# Patient Record
Sex: FEMALE | URBAN - METROPOLITAN AREA
[De-identification: names, ages, dates, MRNs, and addresses within clinical notes are randomized per-mention and may not be internally consistent; named-entity substitution may affect disease eponyms.]

---

## 2017-04-14 ENCOUNTER — APPOINTMENT (RX ONLY)
Dept: URBAN - METROPOLITAN AREA CLINIC 158 | Facility: CLINIC | Age: 67
Setting detail: DERMATOLOGY
End: 2017-04-14

## 2017-04-14 DIAGNOSIS — D22 MELANOCYTIC NEVI: ICD-10-CM

## 2017-04-14 DIAGNOSIS — L40.0 PSORIASIS VULGARIS: ICD-10-CM

## 2017-04-14 DIAGNOSIS — L81.4 OTHER MELANIN HYPERPIGMENTATION: ICD-10-CM

## 2017-04-14 DIAGNOSIS — L82.1 OTHER SEBORRHEIC KERATOSIS: ICD-10-CM

## 2017-04-14 DIAGNOSIS — D18.0 HEMANGIOMA: ICD-10-CM

## 2017-04-14 DIAGNOSIS — L72.0 EPIDERMAL CYST: ICD-10-CM

## 2017-04-14 PROBLEM — D18.01 HEMANGIOMA OF SKIN AND SUBCUTANEOUS TISSUE: Status: ACTIVE | Noted: 2017-04-14

## 2017-04-14 PROBLEM — D22.5 MELANOCYTIC NEVI OF TRUNK: Status: ACTIVE | Noted: 2017-04-14

## 2017-04-14 PROBLEM — L85.3 XEROSIS CUTIS: Status: ACTIVE | Noted: 2017-04-14

## 2017-04-14 PROBLEM — E03.9 HYPOTHYROIDISM, UNSPECIFIED: Status: ACTIVE | Noted: 2017-04-14

## 2017-04-14 PROBLEM — L55.1 SUNBURN OF SECOND DEGREE: Status: ACTIVE | Noted: 2017-04-14

## 2017-04-14 PROBLEM — E78.5 HYPERLIPIDEMIA, UNSPECIFIED: Status: ACTIVE | Noted: 2017-04-14

## 2017-04-14 PROBLEM — J30.1 ALLERGIC RHINITIS DUE TO POLLEN: Status: ACTIVE | Noted: 2017-04-14

## 2017-04-14 PROBLEM — M12.9 ARTHROPATHY, UNSPECIFIED: Status: ACTIVE | Noted: 2017-04-14

## 2017-04-14 PROBLEM — K21.9 GASTRO-ESOPHAGEAL REFLUX DISEASE WITHOUT ESOPHAGITIS: Status: ACTIVE | Noted: 2017-04-14

## 2017-04-14 PROBLEM — L57.0 ACTINIC KERATOSIS: Status: ACTIVE | Noted: 2017-04-14

## 2017-04-14 PROCEDURE — ? COUNSELING

## 2017-04-14 PROCEDURE — ? PRESCRIPTION

## 2017-04-14 PROCEDURE — ? TREATMENT REGIMEN

## 2017-04-14 PROCEDURE — 99203 OFFICE O/P NEW LOW 30 MIN: CPT

## 2017-04-14 RX ORDER — BETAMETHASONE DIPROPIONATE 0.5 MG/G
OINTMENT, AUGMENTED TOPICAL TWICE DAILY
Qty: 45 | Refills: 5 | Status: ERX | COMMUNITY
Start: 2017-04-14

## 2017-04-14 RX ADMIN — BETAMETHASONE DIPROPIONATE: 0.5 OINTMENT, AUGMENTED TOPICAL at 18:27

## 2017-04-14 ASSESSMENT — LOCATION DETAILED DESCRIPTION DERM
LOCATION DETAILED: LEFT VENTRAL DISTAL FOREARM
LOCATION DETAILED: RIGHT INFERIOR UPPER BACK
LOCATION DETAILED: RIGHT VENTRAL DISTAL FOREARM
LOCATION DETAILED: MID POSTERIOR NECK
LOCATION DETAILED: RIGHT ANTERIOR PROXIMAL THIGH
LOCATION DETAILED: LEFT MID-UPPER BACK
LOCATION DETAILED: RIGHT LATERAL ABDOMEN
LOCATION DETAILED: RIGHT VENTRAL PROXIMAL FOREARM
LOCATION DETAILED: LEFT ANTERIOR PROXIMAL THIGH
LOCATION DETAILED: RIGHT MID-UPPER BACK
LOCATION DETAILED: RIGHT SUPERIOR LATERAL LOWER BACK
LOCATION DETAILED: LEFT INFERIOR LATERAL MIDBACK
LOCATION DETAILED: LEFT SUPERIOR UPPER BACK
LOCATION DETAILED: UMBILICUS
LOCATION DETAILED: LEFT VENTRAL PROXIMAL FOREARM
LOCATION DETAILED: RIGHT PROXIMAL PRETIBIAL REGION
LOCATION DETAILED: RIGHT POPLITEAL SKIN
LOCATION DETAILED: LEFT DISTAL POSTERIOR THIGH
LOCATION DETAILED: RIGHT SUPERIOR LATERAL MIDBACK

## 2017-04-14 ASSESSMENT — LOCATION ZONE DERM
LOCATION ZONE: TRUNK
LOCATION ZONE: NECK
LOCATION ZONE: LEG
LOCATION ZONE: ARM

## 2017-04-14 ASSESSMENT — LOCATION SIMPLE DESCRIPTION DERM
LOCATION SIMPLE: RIGHT UPPER BACK
LOCATION SIMPLE: LEFT POSTERIOR THIGH
LOCATION SIMPLE: POSTERIOR NECK
LOCATION SIMPLE: RIGHT LOWER BACK
LOCATION SIMPLE: LEFT LOWER BACK
LOCATION SIMPLE: RIGHT PRETIBIAL REGION
LOCATION SIMPLE: LEFT UPPER BACK
LOCATION SIMPLE: RIGHT POPLITEAL SKIN
LOCATION SIMPLE: LEFT FOREARM
LOCATION SIMPLE: RIGHT THIGH
LOCATION SIMPLE: RIGHT FOREARM
LOCATION SIMPLE: LEFT THIGH
LOCATION SIMPLE: ABDOMEN

## 2017-04-14 NOTE — PROCEDURE: TREATMENT REGIMEN
Action 4: Continue
Detail Level: Zone
Initiate Regimen: betamethasone, augmented 0.05 % topical ointment
Plan: Pt to f/u prn

## 2023-11-03 ENCOUNTER — HOSPITAL ENCOUNTER (OUTPATIENT)
Dept: RADIOLOGY | Facility: HOSPITAL | Age: 73
Discharge: HOME | End: 2023-11-03
Attending: FAMILY MEDICINE
Payer: MEDICARE

## 2023-11-03 ENCOUNTER — OFFICE VISIT (OUTPATIENT)
Dept: FAMILY MEDICINE | Facility: CLINIC | Age: 73
End: 2023-11-03
Payer: MEDICARE

## 2023-11-03 VITALS — DIASTOLIC BLOOD PRESSURE: 83 MMHG | SYSTOLIC BLOOD PRESSURE: 129 MMHG | WEIGHT: 210.88 LBS | HEART RATE: 91 BPM

## 2023-11-03 DIAGNOSIS — M17.10 PRIMARY OSTEOARTHRITIS OF KNEE, UNSPECIFIED LATERALITY: Chronic | ICD-10-CM

## 2023-11-03 DIAGNOSIS — E11.9 TYPE 2 DIABETES MELLITUS WITHOUT COMPLICATION, WITH LONG-TERM CURRENT USE OF INSULIN: Chronic | ICD-10-CM

## 2023-11-03 DIAGNOSIS — M51.36 DDD (DEGENERATIVE DISC DISEASE), LUMBAR: Chronic | ICD-10-CM

## 2023-11-03 DIAGNOSIS — Z79.4 TYPE 2 DIABETES MELLITUS WITHOUT COMPLICATION, WITH LONG-TERM CURRENT USE OF INSULIN: Chronic | ICD-10-CM

## 2023-11-03 DIAGNOSIS — R07.81 RIB PAIN: Primary | ICD-10-CM

## 2023-11-03 DIAGNOSIS — Z85.3 HISTORY OF BREAST CANCER: Chronic | ICD-10-CM

## 2023-11-03 DIAGNOSIS — E03.9 HYPOTHYROIDISM, UNSPECIFIED TYPE: Chronic | ICD-10-CM

## 2023-11-03 DIAGNOSIS — Z91.81 HISTORY OF RECENT FALL: ICD-10-CM

## 2023-11-03 DIAGNOSIS — K21.9 GASTROESOPHAGEAL REFLUX DISEASE WITHOUT ESOPHAGITIS: Chronic | ICD-10-CM

## 2023-11-03 DIAGNOSIS — R30.0 DYSURIA: ICD-10-CM

## 2023-11-03 DIAGNOSIS — R07.81 RIB PAIN: ICD-10-CM

## 2023-11-03 PROBLEM — M51.369 DDD (DEGENERATIVE DISC DISEASE), LUMBAR: Chronic | Status: ACTIVE | Noted: 2023-11-03

## 2023-11-03 LAB
BILIRUB SERPL-MCNC: NORMAL MG/DL
BLOOD URINE, POC: NORMAL
COLOR, POC UA: YELLOW
GLUCOSE UR QL STRIP: NORMAL
KETONES UR QL STRIP: NORMAL
LEUKOCYTE ESTERASE URINE, POC: NORMAL
NITRITE, POC UA: NORMAL
PH, POC UA: 6
PROTEIN, POC: NORMAL
SPECIFIC GRAVITY, POC UA: 1.01
UROBILINOGEN, POC UA: 0.2

## 2023-11-03 PROCEDURE — 3074F PR MOST RECENT SYSTOLIC BLOOD PRESSURE < 130 MM HG: ICD-10-PCS | Mod: ,,, | Performed by: FAMILY MEDICINE

## 2023-11-03 PROCEDURE — 3079F PR MOST RECENT DIASTOLIC BLOOD PRESSURE 80-89 MM HG: ICD-10-PCS | Mod: ,,, | Performed by: FAMILY MEDICINE

## 2023-11-03 PROCEDURE — 3079F DIAST BP 80-89 MM HG: CPT | Mod: ,,, | Performed by: FAMILY MEDICINE

## 2023-11-03 PROCEDURE — 81003 POCT URINALYSIS W/O SCOPE: ICD-10-PCS | Mod: QW,,, | Performed by: FAMILY MEDICINE

## 2023-11-03 PROCEDURE — 1159F MED LIST DOCD IN RCRD: CPT | Mod: ,,, | Performed by: FAMILY MEDICINE

## 2023-11-03 PROCEDURE — 81003 URINALYSIS AUTO W/O SCOPE: CPT | Mod: QW,,, | Performed by: FAMILY MEDICINE

## 2023-11-03 PROCEDURE — 1159F PR MEDICATION LIST DOCUMENTED IN MEDICAL RECORD: ICD-10-PCS | Mod: ,,, | Performed by: FAMILY MEDICINE

## 2023-11-03 PROCEDURE — 99204 OFFICE O/P NEW MOD 45 MIN: CPT | Mod: ,,, | Performed by: FAMILY MEDICINE

## 2023-11-03 PROCEDURE — 1160F PR REVIEW ALL MEDS BY PRESCRIBER/CLIN PHARMACIST DOCUMENTED: ICD-10-PCS | Mod: ,,, | Performed by: FAMILY MEDICINE

## 2023-11-03 PROCEDURE — 1160F RVW MEDS BY RX/DR IN RCRD: CPT | Mod: ,,, | Performed by: FAMILY MEDICINE

## 2023-11-03 PROCEDURE — 71100 X-RAY EXAM RIBS UNI 2 VIEWS: CPT | Mod: TC,RT

## 2023-11-03 PROCEDURE — 99204 PR OFFICE/OUTPT VISIT, NEW, LEVL IV, 45-59 MIN: ICD-10-PCS | Mod: ,,, | Performed by: FAMILY MEDICINE

## 2023-11-03 PROCEDURE — 3074F SYST BP LT 130 MM HG: CPT | Mod: ,,, | Performed by: FAMILY MEDICINE

## 2023-11-03 RX ORDER — METFORMIN HYDROCHLORIDE 500 MG/1
500 TABLET, EXTENDED RELEASE ORAL 3 TIMES DAILY
COMMUNITY
Start: 2023-09-22

## 2023-11-03 RX ORDER — LEVOTHYROXINE SODIUM 137 UG/1
137 TABLET ORAL DAILY
COMMUNITY
Start: 2023-09-06

## 2023-11-03 RX ORDER — HYDROCODONE BITARTRATE AND ACETAMINOPHEN 5; 325 MG/1; MG/1
1 TABLET ORAL EVERY 6 HOURS PRN
Qty: 20 TABLET | Refills: 0 | Status: SHIPPED | OUTPATIENT
Start: 2023-11-03

## 2023-11-03 RX ORDER — ANASTROZOLE 1 MG/1
1 TABLET ORAL DAILY
COMMUNITY
Start: 2023-08-09

## 2023-11-03 RX ORDER — OMEPRAZOLE 40 MG/1
1 CAPSULE, DELAYED RELEASE ORAL DAILY
COMMUNITY
Start: 2023-06-07

## 2023-11-03 NOTE — PROGRESS NOTES
Jean Carlos Berger MD   [facility]  MEDICAL GROUP [address] [place] [address]  [phone]      PATIENT NAME: Margarita Turner  : 1950  DATE: 11/3/23  MRN: [id number]      Billing Provider: Jean Carlos Berger MD  Level of Service: [place] OFFICE/OUTPT VISIT, NEW, LEVKELVIN IV, 45-59 MIN  Patient PCP Information       Provider PCP Type    Jean Carlos Berger MD General            Reason for Visit / Chief Complaint: Chest Pain (Right sided rib pain secondary to fall on last night. States she tripped over something outdoors and fell. )       Update PCP  Update Chief Complaint         History of Present Illness / Problem Focused Workflow     Margarita Turner presents to the clinic with Chest Pain (Right sided rib pain secondary to fall on last night. States she tripped over something outdoors and fell. )       72 yo WF with hypothyroidism, DM, osteoarthritis, DDD, GERD and history of breast cancer.  She has recently been treated for UTI and wants UA to make sure that it has cleared.  Was walking dog last night and fell.  Has injury to ribs on right side.      New patient.  Has been getting care in [place], [place].  PCP there, as well as oncologist and ortho.  Scheduled to have TKR on 2023 (Dr. Santiago).  She is UTD on mammogram (history of breast cancer s/p partial mastectomy/lymphadenectomy/chemo tx and rad tx), colonoscopy and DEXA scan.        Review of Systems     Review of Systems   Musculoskeletal:  Positive for arthralgias and leg pain.   All other systems reviewed and are negative.       Medical / Social / Family History     Past Medical History:   Diagnosis Date    Breast cancer     GERD (gastroesophageal reflux disease)     History of vertebral fracture     Hypothyroidism, unspecified     OA (osteoarthritis) of knee     Type 2 diabetes mellitus without complications        Past Surgical History:   Procedure Laterality Date    MASTECTOMY,  PARTIAL, WITH AXILLARY LYMPHADENECTOMY         Social History    reports that she has never smoked. She has never been exposed to tobacco smoke. She has never used smokeless tobacco. She reports that she does not drink alcohol and does not use drugs.   Social History     Tobacco Use    Smoking status: Never     Passive exposure: Never    Smokeless tobacco: Never   Substance Use Topics    Alcohol use: Never    Drug use: Never       Family History  History reviewed. No pertinent family history.    Medications and Allergies     Medications  Outpatient Medications Marked as Taking for the 11/3/23 encounter (Office Visit) with Jean Carlos Berger MD   Medication Sig Dispense Refill    anastrozole (ARIMIDEX) 1 mg Tab Take 1 mg by mouth once daily.      levothyroxine (SYNTHROID) 137 MCG Tab tablet Take 137 mcg by mouth once daily.      metFORMIN (GLUCOPHAGE-XR) 500 MG ER 24hr tablet Take 500 mg by mouth 3 (three) times daily.      omeprazole (PRILOSEC) 40 MG capsule Take 1 capsule by mouth once daily.         Allergies  Review of patient's allergies indicates:  Not on File    Physical Examination     Vitals:    11/03/23 1201   BP: 129/83   Pulse: 91     Physical Exam  Vitals reviewed.   Constitutional:       Appearance: Normal appearance.   HENT:      Head: Normocephalic and atraumatic.   Eyes:      Extraocular Movements: Extraocular movements intact.      Conjunctiva/sclera: Conjunctivae normal.      Pupils: Pupils are equal, round, and reactive to light.   Cardiovascular:      Rate and Rhythm: Normal rate and regular rhythm.      Heart sounds: Normal heart sounds.   Pulmonary:      Effort: Pulmonary effort is normal.      Breath sounds: Normal breath sounds.   Musculoskeletal:         General: Tenderness present. Normal range of motion.      Cervical back: Normal range of motion.   Skin:     General: Skin is warm and dry.   Neurological:      General: No focal deficit present.      Mental Status: She is alert and  oriented to person, place, and time.   Psychiatric:         Mood and Affect: Mood normal.         Behavior: Behavior normal.      Office Visit on 11/03/2023   Component Date Value Ref Range Status    Color, UA 11/03/2023 Yellow   Final    Spec Grav UA 11/03/2023 1.015   Final    pH, UA 11/03/2023 6.0   Final    WBC, UA 11/03/2023 neg   Final    Nitrite, UA 11/03/2023 neg   Final    Protein, POC 11/03/2023 neg   Final    Glucose, UA 11/03/2023 neg   Final    Ketones, UA 11/03/2023 neg   Final    Bilirubin, POC 11/03/2023 neg   Final    Urobilinogen, UA 11/03/2023 0.2   Final    Blood, UA 11/03/2023 neg   Final       X-Ray Ribs 2 View Right  Narrative: EXAMINATION:  XR RIBS 2 VIEW RIGHT    CLINICAL HISTORY:  Pleurodynia    COMPARISON:  None    TECHNIQUE:  Frontal and oblique views of the right ribs.    FINDINGS:  No convincing acute displaced rib fracture demonstrated.  Scattered degenerative change of the thoracic spine.  Impression: As above.    Point of Service: [facility]    Electronically signed by: Patrick Csataneda  Date:    11/03/2023  Time:    12:49          Assessment and Plan (including Health Maintenance)      Problem List  Smart Sets  Document Outside HM   :    Plan: pain control.  Recommend heating pad to ribs on low heat.          Health Maintenance Due   Topic Date Due    Hepatitis C Screening  Never done    Lipid Panel  Never done    TETANUS VACCINE  Never done    DEXA Scan  Never done    Colorectal Cancer Screening  Never done    Shingles Vaccine (1 of 2) Never done    RSV Vaccine (Age 60+) (1 - 1-dose 60+ series) Never done    Pneumococcal Vaccines (Age 65+) (1 - PCV) Never done    Influenza Vaccine (1) Never done    COVID-19 Vaccine (2 - 2023-24 season) 09/01/2023       Problem List Items Addressed This Visit          Neuro    DDD (degenerative disc disease), lumbar (Chronic)       Oncology    History of breast cancer (Chronic)       Endocrine    Type 2 diabetes  mellitus without complication, with long-term current use of insulin (Chronic)    Relevant Medications    metFORMIN (GLUCOPHAGE-XR) 500 MG ER 24hr tablet    Hypothyroidism (Chronic)       GI    Gastroesophageal reflux disease without esophagitis (Chronic)       Orthopedic    Primary osteoarthritis of knee (Chronic)     Other Visit Diagnoses       Rib pain    -  Primary    Relevant Medications    HYDROcodone-acetaminophen (NORCO) 5-325 mg per tablet    Other Relevant Orders    X-Ray Ribs 2 View Right (Completed)    History of recent fall        Relevant Orders    X-Ray Ribs 2 View Right (Completed)    Dysuria        Relevant Orders    POCT URINALYSIS W/O SCOPE (Completed)            The patient has no Health Maintenance topics of status Not Due    No future appointments.           Signature:  MD SAMUEL [facility]  MEDICAL GROUP [facility] - FAMILY MEDICINE  [address] [place] [id number]  [phone]    Date of encounter: 11/3/23

## 2024-05-21 ENCOUNTER — OFFICE VISIT (OUTPATIENT)
Dept: FAMILY MEDICINE | Facility: CLINIC | Age: 74
End: 2024-05-21
Payer: MEDICARE

## 2024-05-21 VITALS
HEART RATE: 109 BPM | DIASTOLIC BLOOD PRESSURE: 81 MMHG | SYSTOLIC BLOOD PRESSURE: 119 MMHG | TEMPERATURE: 98 F | WEIGHT: 194.81 LBS

## 2024-05-21 DIAGNOSIS — Z85.3 HISTORY OF BREAST CANCER: Chronic | ICD-10-CM

## 2024-05-21 DIAGNOSIS — E11.9 TYPE 2 DIABETES MELLITUS WITHOUT COMPLICATION, WITH LONG-TERM CURRENT USE OF INSULIN: Chronic | ICD-10-CM

## 2024-05-21 DIAGNOSIS — I83.90 VARICOSE VEINS OF CALF: Chronic | ICD-10-CM

## 2024-05-21 DIAGNOSIS — N39.0 URINARY TRACT INFECTION WITHOUT HEMATURIA, SITE UNSPECIFIED: ICD-10-CM

## 2024-05-21 DIAGNOSIS — E03.9 HYPOTHYROIDISM, UNSPECIFIED TYPE: Chronic | ICD-10-CM

## 2024-05-21 DIAGNOSIS — Z79.4 TYPE 2 DIABETES MELLITUS WITHOUT COMPLICATION, WITH LONG-TERM CURRENT USE OF INSULIN: Chronic | ICD-10-CM

## 2024-05-21 DIAGNOSIS — R30.0 DYSURIA: Primary | ICD-10-CM

## 2024-05-21 LAB
BILIRUB SERPL-MCNC: NORMAL MG/DL
BLOOD URINE, POC: NORMAL
COLOR, POC UA: YELLOW
GLUCOSE UR QL STRIP: NORMAL
KETONES UR QL STRIP: NORMAL
LEUKOCYTE ESTERASE URINE, POC: NORMAL
NITRITE, POC UA: NORMAL
PH, POC UA: 5.5
PROTEIN, POC: 300
SPECIFIC GRAVITY, POC UA: 1.03
UROBILINOGEN, POC UA: 0.2

## 2024-05-21 PROCEDURE — 1125F AMNT PAIN NOTED PAIN PRSNT: CPT | Mod: ,,, | Performed by: FAMILY MEDICINE

## 2024-05-21 PROCEDURE — 81003 URINALYSIS AUTO W/O SCOPE: CPT | Mod: QW,,, | Performed by: FAMILY MEDICINE

## 2024-05-21 PROCEDURE — 1159F MED LIST DOCD IN RCRD: CPT | Mod: ,,, | Performed by: FAMILY MEDICINE

## 2024-05-21 PROCEDURE — 3074F SYST BP LT 130 MM HG: CPT | Mod: ,,, | Performed by: FAMILY MEDICINE

## 2024-05-21 PROCEDURE — 1160F RVW MEDS BY RX/DR IN RCRD: CPT | Mod: ,,, | Performed by: FAMILY MEDICINE

## 2024-05-21 PROCEDURE — G2211 COMPLEX E/M VISIT ADD ON: HCPCS | Mod: ,,, | Performed by: FAMILY MEDICINE

## 2024-05-21 PROCEDURE — 3079F DIAST BP 80-89 MM HG: CPT | Mod: ,,, | Performed by: FAMILY MEDICINE

## 2024-05-21 PROCEDURE — 99214 OFFICE O/P EST MOD 30 MIN: CPT | Mod: ,,, | Performed by: FAMILY MEDICINE

## 2024-05-21 PROCEDURE — 3288F FALL RISK ASSESSMENT DOCD: CPT | Mod: ,,, | Performed by: FAMILY MEDICINE

## 2024-05-21 PROCEDURE — 1101F PT FALLS ASSESS-DOCD LE1/YR: CPT | Mod: ,,, | Performed by: FAMILY MEDICINE

## 2024-05-21 RX ORDER — CYCLOBENZAPRINE HCL 10 MG
10 TABLET ORAL NIGHTLY
Qty: 90 TABLET | Refills: 1 | Status: SHIPPED | OUTPATIENT
Start: 2024-05-21

## 2024-05-21 RX ORDER — INSULIN DEGLUDEC 200 U/ML
24 INJECTION, SOLUTION SUBCUTANEOUS DAILY
Qty: 2 PEN | Refills: 7 | Status: SHIPPED | OUTPATIENT
Start: 2024-05-21

## 2024-05-21 RX ORDER — NITROFURANTOIN 25; 75 MG/1; MG/1
100 CAPSULE ORAL 2 TIMES DAILY
Qty: 14 CAPSULE | Refills: 0 | Status: SHIPPED | OUTPATIENT
Start: 2024-05-21

## 2024-05-21 RX ORDER — CYCLOBENZAPRINE HCL 10 MG
1 TABLET ORAL 2 TIMES DAILY PRN
COMMUNITY
Start: 2024-04-11

## 2024-05-21 RX ORDER — ATORVASTATIN CALCIUM 20 MG/1
20 TABLET, FILM COATED ORAL NIGHTLY
COMMUNITY
Start: 2024-02-05

## 2024-05-21 NOTE — PROGRESS NOTES
Jean Carlos Berger MD   [facility]  MEDICAL GROUP [facility] - FAMILY MEDICINE  [address] [place] [address] [place] [address]  [phone]      PATIENT NAME: Margarita Turner  : 1950  DATE: 24  MRN: [id number]      Billing Provider: Jean Carlos Berger MD  Level of Service: [place] OFFICE/OUTPT VISIT, EST, LEVL IV, 30-39 MIN  Patient PCP Information       Provider PCP Type    Jean Carlos Berger MD General            Reason for Visit / Chief Complaint: Urinary Urgency (Bladder spasms/) and Headache       Update PCP  Update Chief Complaint         History of Present Illness / Problem Focused Workflow     Margarita Turner presents to the clinic with Urinary Urgency (Bladder spasms/) and Headache       73 yo WF who reports UTI symptoms x 2 days.  Since here last she has had TKR done and reports that she has recovered well.    Patient reports that she recently had labs done at provider in [place].  She will sign a release of records to have results sent her.  She says that her A1c was 6.3.  Needs insulin refilled.  Also takes januvia and metformin for her diabetes.      Has recently been to vein clinic in [place] and is scheduled to have some tests done in next few weeks.    Also has appointment to see ENT in [place] (Dr. Hayward) for headaches.        Review of Systems     Review of Systems   Constitutional:  Negative for activity change, chills and fever.   HENT:  Negative for sore throat.    Eyes:  Negative for pain.   Respiratory:  Negative for cough, chest tightness and shortness of breath.    Cardiovascular:  Negative for chest pain and palpitations.   Gastrointestinal:  Negative for abdominal pain.   Genitourinary:  Positive for dysuria and frequency.   Musculoskeletal:  Positive for arthralgias.   Neurological:  Negative for dizziness, syncope and weakness.   Psychiatric/Behavioral:  Negative for confusion.         Medical / Social / Family History     Past Medical History:   Diagnosis  Date    Breast cancer     GERD (gastroesophageal reflux disease)     History of vertebral fracture     Hypothyroidism, unspecified     OA (osteoarthritis) of knee     Type 2 diabetes mellitus without complications        Past Surgical History:   Procedure Laterality Date    MASTECTOMY, PARTIAL, WITH AXILLARY LYMPHADENECTOMY         Social History    reports that she has never smoked. She has never been exposed to tobacco smoke. She has never used smokeless tobacco. She reports that she does not drink alcohol and does not use drugs.   Social History     Tobacco Use    Smoking status: Never     Passive exposure: Never    Smokeless tobacco: Never   Substance Use Topics    Alcohol use: Never    Drug use: Never       Family History  No family history on file.    Medications and Allergies     Medications  Outpatient Medications Marked as Taking for the 5/21/24 encounter (Office Visit) with Jean Carlos Berger MD   Medication Sig Dispense Refill    atorvastatin (LIPITOR) 20 MG tablet Take 20 mg by mouth every evening.      cyclobenzaprine (FLEXERIL) 10 MG tablet Take 1 tablet by mouth 2 (two) times daily as needed.         Allergies  Review of patient's allergies indicates:  No Known Allergies    Physical Examination     Vitals:    05/21/24 1107   BP: 119/81   Pulse: 109   Temp: 98.1 °F (36.7 °C)     Physical Exam  Vitals reviewed.   Constitutional:       Appearance: Normal appearance.   HENT:      Head: Normocephalic and atraumatic.   Eyes:      Extraocular Movements: Extraocular movements intact.      Conjunctiva/sclera: Conjunctivae normal.      Pupils: Pupils are equal, round, and reactive to light.   Cardiovascular:      Rate and Rhythm: Normal rate and regular rhythm.      Heart sounds: Normal heart sounds.   Pulmonary:      Effort: Pulmonary effort is normal.      Breath sounds: Normal breath sounds.   Musculoskeletal:         General: Normal range of motion.      Cervical back: Normal range of motion.   Skin:      General: Skin is warm and dry.   Neurological:      General: No focal deficit present.      Mental Status: She is alert and oriented to person, place, and time.   Psychiatric:         Mood and Affect: Mood normal.         Behavior: Behavior normal.          Assessment and Plan (including Health Maintenance)      Problem List  Smart Sets  Document Outside HM   :    Plan: This encounter included increased complexity inherent to the evaluation and management associated with medical care services that serve as the continuing focal point for all needed health care services and/or with medical care services that are part of ongoing care related to a patients single, serious condition or a complex condition.  Previous labs and encounters have been reviewed during the course of this visit in order to make medical decisions related to ongoing care of the patient.         Health Maintenance Due   Topic Date Due    Hepatitis C Screening  Never done    Lipid Panel  Never done    Hemoglobin A1c  Never done    Diabetes Urine Screening  Never done    Eye Exam  Never done    TETANUS VACCINE  Never done    Shingles Vaccine (1 of 2) Never done    RSV Vaccine (Age 60+ and Pregnant patients) (1 - 1-dose 60+ series) Never done    Pneumococcal Vaccines (Age 65+) (2 of 2 - PCV) 02/05/2020    DEXA Scan  09/17/2021    COVID-19 Vaccine (4 - 2023-24 season) 09/01/2023       Problem List Items Addressed This Visit          Oncology    History of breast cancer (Chronic)       Endocrine    Type 2 diabetes mellitus without complication, with long-term current use of insulin (Chronic)    Relevant Medications    insulin regular (NOVOLIN R REGULAR U100 INSULIN) 100 unit/mL Inj injection    insulin degludec (TRESIBA FLEXTOUCH U-200) 200 unit/mL (3 mL) insulin pen    Hypothyroidism (Chronic)     Other Visit Diagnoses       Dysuria    -  Primary    Relevant Orders    POCT URINALYSIS W/O SCOPE (Completed)    Urinary tract infection without hematuria,  site unspecified        Relevant Medications    nitrofurantoin, macrocrystal-monohydrate, (MACROBID) 100 MG capsule    Other Relevant Orders    Urine culture    Varicose veins of calf  (Chronic)               Health Maintenance Topics with due status: Not Due       Topic Last Completion Date    Influenza Vaccine 11/03/2022       Future Appointments   Date Time Provider Department Center   6/19/2024 10:00 AM [facility]            Signature:  [facility]  MEDICAL GROUP [facility] - FAMILY MEDICINE  [address] [place] [id number]  [phone]    Date of encounter: 5/21/24

## 2024-05-22 ENCOUNTER — TELEPHONE (OUTPATIENT)
Dept: FAMILY MEDICINE | Facility: CLINIC | Age: 74
End: 2024-05-22
Payer: MEDICARE

## 2024-05-22 RX ORDER — FLUCONAZOLE 150 MG/1
150 TABLET ORAL DAILY
Qty: 1 TABLET | Refills: 0 | Status: SHIPPED | OUTPATIENT
Start: 2024-05-22 | End: 2024-05-23

## 2024-06-09 DIAGNOSIS — Z71.89 COMPLEX CARE COORDINATION: ICD-10-CM

## 2024-06-19 ENCOUNTER — OFFICE VISIT (OUTPATIENT)
Dept: FAMILY MEDICINE | Facility: CLINIC | Age: 74
End: 2024-06-19
Payer: MEDICARE

## 2024-06-19 VITALS — SYSTOLIC BLOOD PRESSURE: 118 MMHG | HEART RATE: 111 BPM | WEIGHT: 196.31 LBS | DIASTOLIC BLOOD PRESSURE: 76 MMHG

## 2024-06-19 DIAGNOSIS — N39.0 URINARY TRACT INFECTION WITHOUT HEMATURIA, SITE UNSPECIFIED: Primary | ICD-10-CM

## 2024-06-19 DIAGNOSIS — E03.9 HYPOTHYROIDISM, UNSPECIFIED TYPE: ICD-10-CM

## 2024-06-19 DIAGNOSIS — E11.9 TYPE 2 DIABETES MELLITUS WITHOUT COMPLICATION, WITH LONG-TERM CURRENT USE OF INSULIN: Chronic | ICD-10-CM

## 2024-06-19 DIAGNOSIS — J32.9 CHRONIC SINUSITIS, UNSPECIFIED LOCATION: Chronic | ICD-10-CM

## 2024-06-19 DIAGNOSIS — I83.90 VARICOSE VEINS OF CALF: Chronic | ICD-10-CM

## 2024-06-19 DIAGNOSIS — Z79.4 TYPE 2 DIABETES MELLITUS WITHOUT COMPLICATION, WITH LONG-TERM CURRENT USE OF INSULIN: Chronic | ICD-10-CM

## 2024-06-19 LAB
BILIRUB SERPL-MCNC: NORMAL MG/DL
BLOOD URINE, POC: NORMAL
COLOR, POC UA: YELLOW
GLUCOSE UR QL STRIP: NORMAL
KETONES UR QL STRIP: NORMAL
LEUKOCYTE ESTERASE URINE, POC: NORMAL
NITRITE, POC UA: NORMAL
PH, POC UA: 5.5
PROTEIN, POC: NORMAL
SPECIFIC GRAVITY, POC UA: 1.01
UROBILINOGEN, POC UA: 0.2

## 2024-06-19 PROCEDURE — G2211 COMPLEX E/M VISIT ADD ON: HCPCS | Mod: ,,, | Performed by: FAMILY MEDICINE

## 2024-06-19 PROCEDURE — 81003 URINALYSIS AUTO W/O SCOPE: CPT | Mod: QW,,, | Performed by: FAMILY MEDICINE

## 2024-06-19 PROCEDURE — 3288F FALL RISK ASSESSMENT DOCD: CPT | Mod: ,,, | Performed by: FAMILY MEDICINE

## 2024-06-19 PROCEDURE — 3074F SYST BP LT 130 MM HG: CPT | Mod: ,,, | Performed by: FAMILY MEDICINE

## 2024-06-19 PROCEDURE — 1160F RVW MEDS BY RX/DR IN RCRD: CPT | Mod: ,,, | Performed by: FAMILY MEDICINE

## 2024-06-19 PROCEDURE — 3078F DIAST BP <80 MM HG: CPT | Mod: ,,, | Performed by: FAMILY MEDICINE

## 2024-06-19 PROCEDURE — 1159F MED LIST DOCD IN RCRD: CPT | Mod: ,,, | Performed by: FAMILY MEDICINE

## 2024-06-19 PROCEDURE — 1126F AMNT PAIN NOTED NONE PRSNT: CPT | Mod: ,,, | Performed by: FAMILY MEDICINE

## 2024-06-19 PROCEDURE — 99214 OFFICE O/P EST MOD 30 MIN: CPT | Mod: ,,, | Performed by: FAMILY MEDICINE

## 2024-06-19 PROCEDURE — 1101F PT FALLS ASSESS-DOCD LE1/YR: CPT | Mod: ,,, | Performed by: FAMILY MEDICINE

## 2024-06-19 RX ORDER — CEFDINIR 300 MG/1
300 CAPSULE ORAL 2 TIMES DAILY
COMMUNITY
Start: 2024-06-10

## 2024-06-19 NOTE — PROGRESS NOTES
"   Jean Carlos Berger MD   [facility]  MEDICAL GROUP [facility] FAMILY MEDICINE  [address] [place] [address] [place] [address]  [phone]      PATIENT NAME: Margarita Turner  : 1950  DATE: 24  MRN: [id number]      Billing Provider: Jean Carlos Berger MD  Level of Service: [place] OFFICE/OUTPT VISIT, EST, LEVL IV, 30-39 MIN  Patient PCP Information       Provider PCP Type    Jean Carlos Berger MD General            Reason for Visit / Chief Complaint: Follow-up and Urinary Tract Infection       Update PCP  Update Chief Complaint         History of Present Illness / Problem Focused Workflow     Margarita Turner presents to the clinic with Follow-up and Urinary Tract Infection       Her for UTI check.  Has completed course of macrobid recently for treatment of UTI.      Had LE dopplers done yesterday in [place] and they showed "No evidence of significant peripheral arterial disease in the bilateral lower extremities at rest."    Has seen ENT and is currently 7 days into a 21 day course of antibiotics (onicef).  Per pt, she may require balloon sinuplasty if symptoms do not clear with antibiotics.      She has smartwatch that momniotrs HR and reports that it has fluctuated between  over the past couple of weeks.  Denies being symptomatic.  Has a history of PVCs.        Review of Systems     Review of Systems   Constitutional:  Negative for activity change, chills and fever.   HENT:  Positive for sinus pressure/congestion. Negative for sore throat.    Eyes:  Negative for pain.   Respiratory:  Negative for cough, chest tightness and shortness of breath.    Cardiovascular:  Negative for chest pain and palpitations.   Gastrointestinal:  Negative for abdominal pain.   Genitourinary:  Positive for dysuria and frequency.   Musculoskeletal:  Positive for arthralgias.   Neurological:  Negative for dizziness, syncope and weakness.   Psychiatric/Behavioral:  Negative for confusion.         Medical " / Social / Family History     Past Medical History:   Diagnosis Date    Breast cancer     GERD (gastroesophageal reflux disease)     History of vertebral fracture     Hypothyroidism, unspecified     OA (osteoarthritis) of knee     Type 2 diabetes mellitus without complications        Past Surgical History:   Procedure Laterality Date    MASTECTOMY, PARTIAL, WITH AXILLARY LYMPHADENECTOMY         Social History    reports that she has never smoked. She has never been exposed to tobacco smoke. She has never used smokeless tobacco. She reports that she does not drink alcohol and does not use drugs.   Social History     Tobacco Use    Smoking status: Never     Passive exposure: Never    Smokeless tobacco: Never   Substance Use Topics    Alcohol use: Never    Drug use: Never       Family History  No family history on file.    Medications and Allergies     Medications  Outpatient Medications Marked as Taking for the 6/19/24 encounter (Office Visit) with Jean Carlso Berger MD   Medication Sig Dispense Refill    anastrozole (ARIMIDEX) 1 mg Tab Take 1 mg by mouth once daily.      atorvastatin (LIPITOR) 20 MG tablet Take 20 mg by mouth every evening.      cefdinir (OMNICEF) 300 MG capsule Take 300 mg by mouth 2 (two) times daily.      cyclobenzaprine (FLEXERIL) 10 MG tablet Take 1 tablet by mouth 2 (two) times daily as needed.      insulin degludec (TRESIBA FLEXTOUCH U-200) 200 unit/mL (3 mL) insulin pen Inject 24 Units into the skin once daily. 2 pen 7    insulin regular (NOVOLIN R REGULAR U100 INSULIN) 100 unit/mL Inj injection Inject 4 Units into the skin once daily. 3 mL 11    levothyroxine (SYNTHROID) 137 MCG Tab tablet Take 137 mcg by mouth once daily.      metFORMIN (GLUCOPHAGE-XR) 500 MG ER 24hr tablet Take 500 mg by mouth 3 (three) times daily.      omeprazole (PRILOSEC) 40 MG capsule Take 1 capsule by mouth once daily.         Allergies  Review of patient's allergies indicates:  No Known Allergies    Physical  Examination     Vitals:    06/19/24 1024   BP: 118/76   Pulse: (!) 111     Physical Exam  Vitals reviewed.   Constitutional:       Appearance: Normal appearance.   HENT:      Head: Normocephalic and atraumatic.   Eyes:      Extraocular Movements: Extraocular movements intact.      Conjunctiva/sclera: Conjunctivae normal.      Pupils: Pupils are equal, round, and reactive to light.   Cardiovascular:      Rate and Rhythm: Normal rate and regular rhythm.      Heart sounds: Normal heart sounds.   Pulmonary:      Effort: Pulmonary effort is normal.      Breath sounds: Normal breath sounds.   Musculoskeletal:         General: Normal range of motion.      Cervical back: Normal range of motion.   Skin:     General: Skin is warm and dry.   Neurological:      General: No focal deficit present.      Mental Status: She is alert and oriented to person, place, and time.   Psychiatric:         Mood and Affect: Mood normal.         Behavior: Behavior normal.          Assessment and Plan (including Health Maintenance)      Problem List  Smart Sets  Document Outside HM   :    Plan: This encounter included increased complexity inherent to the evaluation and management associated with medical care services that serve as the continuing focal point for all needed health care services and/or with medical care services that are part of ongoing care related to a patients single, serious condition or a complex condition.  Previous labs and encounters have been reviewed during the course of this visit in order to make medical decisions related to ongoing care of the patient.         Health Maintenance Due   Topic Date Due    Hepatitis C Screening  Never done    Lipid Panel  Never done    Hemoglobin A1c  Never done    Diabetes Urine Screening  Never done    Eye Exam  Never done    TETANUS VACCINE  Never done    Shingles Vaccine (1 of 2) Never done    RSV Vaccine (Age 60+ and Pregnant patients) (1 - 1-dose 60+ series) Never done     Pneumococcal Vaccines (Age 65+) (2 of 2 - PCV) 02/05/2020    DEXA Scan  09/17/2021    COVID-19 Vaccine (4 - 2023-24 season) 09/01/2023       Problem List Items Addressed This Visit          ENT    Chronic sinusitis (Chronic)    Overview     seeing ENT            Cardiac/Vascular    Varicose veins of calf (Chronic)    Overview     seeing vein clinic            Endocrine    Type 2 diabetes mellitus without complication, with long-term current use of insulin (Chronic)    Relevant Orders    Hemoglobin A1C    Lipid Panel    Microalbumin/Creatinine Ratio, Urine    CBC Auto Differential    Basic Metabolic Panel    Hypothyroidism (Chronic)    Relevant Orders    Lipid Panel    CBC Auto Differential    Basic Metabolic Panel    TSH     Other Visit Diagnoses       Urinary tract infection without hematuria, site unspecified    -  Primary    Relevant Orders    POCT URINALYSIS W/O SCOPE (Completed)            Health Maintenance Topics with due status: Not Due       Topic Last Completion Date    Influenza Vaccine 11/03/2022       Future Appointments   Date Time Provider Department Center   8/30/2024 10:00 AM [facility]            Signature:  MD SAMUEL [facility]  MEDICAL GROUP [facility] - FAMILY MEDICINE  [address] [place] [address]  [phone]    Date of encounter: 6/19/24

## 2024-07-30 DIAGNOSIS — Z79.4 TYPE 2 DIABETES MELLITUS WITHOUT COMPLICATION, WITH LONG-TERM CURRENT USE OF INSULIN: ICD-10-CM

## 2024-07-30 DIAGNOSIS — E11.9 TYPE 2 DIABETES MELLITUS WITHOUT COMPLICATION, WITH LONG-TERM CURRENT USE OF INSULIN: Primary | ICD-10-CM

## 2024-07-30 DIAGNOSIS — E11.9 TYPE 2 DIABETES MELLITUS WITHOUT COMPLICATION, WITH LONG-TERM CURRENT USE OF INSULIN: ICD-10-CM

## 2024-07-30 DIAGNOSIS — G62.9 NEUROPATHY: Primary | ICD-10-CM

## 2024-07-30 DIAGNOSIS — Z79.4 TYPE 2 DIABETES MELLITUS WITHOUT COMPLICATION, WITH LONG-TERM CURRENT USE OF INSULIN: Primary | ICD-10-CM

## 2024-07-30 RX ORDER — GABAPENTIN 100 MG/1
100 CAPSULE ORAL 3 TIMES DAILY
Qty: 270 CAPSULE | Refills: 0 | Status: SHIPPED | OUTPATIENT
Start: 2024-07-30

## 2024-07-30 RX ORDER — GABAPENTIN 100 MG/1
100 CAPSULE ORAL 3 TIMES DAILY
COMMUNITY
Start: 2024-04-11 | End: 2024-07-30 | Stop reason: SDUPTHER

## 2024-07-30 RX ORDER — METFORMIN HYDROCHLORIDE 500 MG/1
500 TABLET, EXTENDED RELEASE ORAL 3 TIMES DAILY
Qty: 90 TABLET | Refills: 5 | Status: SHIPPED | OUTPATIENT
Start: 2024-07-30

## 2024-08-26 DIAGNOSIS — Z79.4 TYPE 2 DIABETES MELLITUS WITHOUT COMPLICATION, WITH LONG-TERM CURRENT USE OF INSULIN: Primary | ICD-10-CM

## 2024-08-26 DIAGNOSIS — E11.9 TYPE 2 DIABETES MELLITUS WITHOUT COMPLICATION, WITH LONG-TERM CURRENT USE OF INSULIN: Primary | ICD-10-CM

## 2024-08-26 RX ORDER — SITAGLIPTIN 100 MG/1
100 TABLET, FILM COATED ORAL DAILY
COMMUNITY
End: 2024-08-26 | Stop reason: SDUPTHER

## 2024-08-26 NOTE — TELEPHONE ENCOUNTER
----- Message from Abigail Mcdowell sent at 8/26/2024 10:20 AM CDT -----  PT NEEDS REFILL ON JANUVIA 100MG CALLED TO THE PHARMACY

## 2024-08-27 RX ORDER — SITAGLIPTIN 100 MG/1
100 TABLET, FILM COATED ORAL DAILY
Qty: 90 TABLET | Refills: 0 | Status: SHIPPED | OUTPATIENT
Start: 2024-08-27

## 2024-08-30 ENCOUNTER — OFFICE VISIT (OUTPATIENT)
Dept: FAMILY MEDICINE | Facility: CLINIC | Age: 74
End: 2024-08-30
Payer: MEDICARE

## 2024-08-30 VITALS
OXYGEN SATURATION: 96 % | HEART RATE: 91 BPM | RESPIRATION RATE: 14 BRPM | HEIGHT: 66 IN | SYSTOLIC BLOOD PRESSURE: 138 MMHG | DIASTOLIC BLOOD PRESSURE: 83 MMHG | WEIGHT: 200.31 LBS | BODY MASS INDEX: 32.19 KG/M2

## 2024-08-30 DIAGNOSIS — Z00.00 ENCOUNTER FOR SUBSEQUENT ANNUAL WELLNESS VISIT (AWV) IN MEDICARE PATIENT: Primary | ICD-10-CM

## 2024-08-30 DIAGNOSIS — Z85.3 HISTORY OF BREAST CANCER: Chronic | ICD-10-CM

## 2024-08-30 DIAGNOSIS — I83.813 VARICOSE VEINS OF BILATERAL LOWER EXTREMITIES WITH PAIN: Chronic | ICD-10-CM

## 2024-08-30 DIAGNOSIS — E66.09 CLASS 1 OBESITY DUE TO EXCESS CALORIES WITH SERIOUS COMORBIDITY AND BODY MASS INDEX (BMI) OF 32.0 TO 32.9 IN ADULT: Chronic | ICD-10-CM

## 2024-08-30 DIAGNOSIS — E03.9 HYPOTHYROIDISM, UNSPECIFIED TYPE: Chronic | ICD-10-CM

## 2024-08-30 DIAGNOSIS — Z79.4 TYPE 2 DIABETES MELLITUS WITH DIABETIC POLYNEUROPATHY, WITH LONG-TERM CURRENT USE OF INSULIN: Chronic | ICD-10-CM

## 2024-08-30 DIAGNOSIS — E11.42 TYPE 2 DIABETES MELLITUS WITH DIABETIC POLYNEUROPATHY, WITH LONG-TERM CURRENT USE OF INSULIN: Chronic | ICD-10-CM

## 2024-08-30 DIAGNOSIS — C79.81: ICD-10-CM

## 2024-08-30 RX ORDER — ASPIRIN 81 MG/1
81 TABLET ORAL
COMMUNITY

## 2024-08-30 NOTE — PROGRESS NOTES
"  Margarita Turner presented for a  Medicare AWV and comprehensive Health Risk Assessment today. The following components were reviewed and updated:    Medical history  Family History  Social history  Allergies and Current Medications  Health Risk Assessment  Health Maintenance  Care Team         ** See Completed Assessments for Annual Wellness Visit within the encounter summary.**         The following assessments were completed:  Living Situation  CAGE  Depression Screening  Timed Get Up and Go  Whisper Test  Cognitive Function Screening  Nutrition Screening  ADL Screening  PAQ Screening        Opioid documentation:      Patient does not have a current opioid prescription.        Vitals:    08/30/24 1032   BP: 138/83   BP Location: Right arm   Patient Position: Sitting   Pulse: 91   Resp: 14   SpO2: 96%   Weight: 90.9 kg (200 lb 4.8 oz)   Height: 5' 6" (1.676 m)     Body mass index is 32.33 kg/m².  Physical Exam  Vitals reviewed.   Constitutional:       Appearance: Normal appearance.   HENT:      Head: Normocephalic and atraumatic.   Eyes:      Extraocular Movements: Extraocular movements intact.      Conjunctiva/sclera: Conjunctivae normal.      Pupils: Pupils are equal, round, and reactive to light.   Cardiovascular:      Rate and Rhythm: Normal rate and regular rhythm.      Heart sounds: Normal heart sounds.   Pulmonary:      Effort: Pulmonary effort is normal.      Breath sounds: Normal breath sounds.   Musculoskeletal:         General: Normal range of motion.      Cervical back: Normal range of motion.   Skin:     General: Skin is warm and dry.   Neurological:      General: No focal deficit present.      Mental Status: She is alert and oriented to person, place, and time.   Psychiatric:         Mood and Affect: Mood normal.         Behavior: Behavior normal.             Diagnoses and health risks identified today and associated recommendations/orders:    1. Encounter for subsequent annual wellness visit (AWV) " in Medicare patient  2025 AWV scheduled    2. Type 2 diabetes mellitus with diabetic polyneuropathy, with long-term current use of insulin  A1C well controlled, continue current care    3. Hypothyroidism, unspecified type  Continue synthroid and will recheck  TSH in 2 months    4. Class 1 obesity due to excess calories with serious comorbidity and body mass index (BMI) of 32.0 to 32.9 in adult  Healthy eating and regular exercise    5. Varicose veins of bilateral lower extremities with pain    - Ambulatory referral/consult to [facility]; Future    6. History of breast cancer  Has oncology follow up scheduled      Provided Margarita with a 5-10 year written screening schedule and personal prevention plan. Recommendations were developed using the USPSTF age appropriate recommendations. Education, counseling, and referrals were provided as needed. After Visit Summary printed and given to patient which includes a list of additional screenings\tests needed.    [facility]  She reports recent eye exam in [place], [place], but does not know the name of the doctor or clinic  Declines pneumonia vaccine at today's visit    Follow up in 10 weeks (on 11/8/2024), or we will recheck thyroid at that time, for 1 year for Annual Wellness Visit.    Jean Carlos Berger MD    I offered to discuss advanced care planning, including how to pick a person who would make decisions for you if you were unable to make them for yourself, called a health care power of , and what kind of decisions you might make such as use of life sustaining treatments such as ventilators and tube feeding when faced with a life limiting illness recorded on a living will that they will need to know. (How you want to be cared for as you near the end of your natural life)     X Patient is interested in learning more about how to make advanced directives.  I provided them paperwork and offered to discuss this with them.

## 2024-08-30 NOTE — PATIENT INSTRUCTIONS
Counseling and Referral of Other Preventative  (Italic type indicates deductible and co-insurance are waived)    Patient Name: Margarita Turner  Today's Date: 8/30/2024    Health Maintenance       Date Due Completion Date    Hepatitis C Screening Never done ---    Eye Exam Never done ---    TETANUS VACCINE Never done ---    Shingles Vaccine (1 of 2) Never done ---    RSV Vaccine (Age 60+ and Pregnant patients) (1 - 1-dose 60+ series) Never done ---    Pneumococcal Vaccines (Age 65+) (2 of 2 - PCV) 02/05/2020 2/5/2019    DEXA Scan 09/17/2021 9/17/2018    COVID-19 Vaccine (4 - 2023-24 season) 09/01/2023 11/3/2022    Influenza Vaccine (1) 09/01/2024 11/3/2022    Hemoglobin A1c 01/30/2025 7/30/2024    Diabetes Urine Screening 07/30/2025 7/30/2024    Lipid Panel 07/30/2025 7/30/2024        No orders of the defined types were placed in this encounter.    The following information is provided to all patients.  This information is to help you find resources for any of the problems found today that may be affecting your health:                  Living healthy guide: [place].gov    Understanding Diabetes: www.diabetes.org      Eating healthy: www.cdc.gov/healthyweight      CDC home safety checklist: www.cdc.gov/steadi/patient.html      Agency on Aging: [place].gov    Alcoholics anonymous (AA): www.aa.org      Physical Activity: www.edmar.nih.gov/vo1cecu      Tobacco use: [place].gov

## 2024-09-24 ENCOUNTER — OFFICE VISIT (OUTPATIENT)
Dept: VASCULAR SURGERY | Facility: CLINIC | Age: 74
End: 2024-09-24
Payer: MEDICARE

## 2024-09-24 VITALS
HEIGHT: 66 IN | WEIGHT: 193.38 LBS | RESPIRATION RATE: 18 BRPM | SYSTOLIC BLOOD PRESSURE: 135 MMHG | DIASTOLIC BLOOD PRESSURE: 68 MMHG | BODY MASS INDEX: 31.08 KG/M2 | HEART RATE: 89 BPM

## 2024-09-24 DIAGNOSIS — I87.2 VENOUS INSUFFICIENCY: Primary | ICD-10-CM

## 2024-09-24 DIAGNOSIS — M79.605 LEG PAIN, BILATERAL: ICD-10-CM

## 2024-09-24 DIAGNOSIS — M79.604 LEG PAIN, BILATERAL: ICD-10-CM

## 2024-09-24 DIAGNOSIS — I83.813 VARICOSE VEINS OF BILATERAL LOWER EXTREMITIES WITH PAIN: Chronic | ICD-10-CM

## 2024-09-24 DIAGNOSIS — R60.0 EDEMA, LOWER EXTREMITY: ICD-10-CM

## 2024-09-24 PROCEDURE — 1159F MED LIST DOCD IN RCRD: CPT | Mod: CPTII,,, | Performed by: FAMILY MEDICINE

## 2024-09-24 PROCEDURE — 99999 PR PBB SHADOW E&M-EST. PATIENT-LVL V: CPT | Mod: PBBFAC,,, | Performed by: FAMILY MEDICINE

## 2024-09-24 PROCEDURE — 3075F SYST BP GE 130 - 139MM HG: CPT | Mod: CPTII,,, | Performed by: FAMILY MEDICINE

## 2024-09-24 PROCEDURE — 99203 OFFICE O/P NEW LOW 30 MIN: CPT | Mod: S$PBB,,, | Performed by: FAMILY MEDICINE

## 2024-09-24 PROCEDURE — 1160F RVW MEDS BY RX/DR IN RCRD: CPT | Mod: CPTII,,, | Performed by: FAMILY MEDICINE

## 2024-09-24 PROCEDURE — 99215 OFFICE O/P EST HI 40 MIN: CPT | Mod: PBBFAC | Performed by: FAMILY MEDICINE

## 2024-09-24 PROCEDURE — 3078F DIAST BP <80 MM HG: CPT | Mod: CPTII,,, | Performed by: FAMILY MEDICINE

## 2024-09-24 RX ORDER — LEVOFLOXACIN 500 MG/1
500 TABLET, FILM COATED ORAL
COMMUNITY
Start: 2024-09-06

## 2024-09-24 RX ORDER — HYDROCODONE BITARTRATE AND ACETAMINOPHEN 10; 325 MG/1; MG/1
1 TABLET ORAL EVERY 8 HOURS PRN
COMMUNITY
Start: 2024-09-19

## 2024-09-24 NOTE — PROGRESS NOTES
VEIN CENTER CLINIC NOTE    Patient ID: Margarita Turner is a 74 y.o. female.    I. HISTORY     Chief Complaint:   Chief Complaint   Patient presents with    Varicose Veins     NP; REF BY KATH TAN; VARICOSE VEINS WITH PAIN         HPI: Magrarita Turner is a 74 y.o. female who is referred here today by Dr. Kath Tan for evaluation of lower extremity edema, varicose veins and pain.  Symptoms are worsening/persistent and began greater than 2 years ago.  Location is bilateral lower extremities below the knees. Symptoms are worse at the end of the day.  History of venous interventions includes none.  Denies family history of venous disease.  Denies history of DVT or cellulitis.  She states she has been wearing compression stockings for about 30-40 years.    The patient had a reflux study performed on 06/25/2024 at [facility] which noted reflux in the bilateral great saphenous veins as well as bilateral pathological perforators.  No DVTs bilaterally.  No deep venous reflux.  No reflux and small saphenous veins.    Venous Disease Medical Necessity Documentation Initial Visit Date:  09/24/2024 Return Check Date:    Have you ever had a rupture or bleed from a varicose vein in your leg(s)?              [] Yes  [x] No   [] Yes   [] No   Have you ever been diagnosed with phlebitis, cellulitis, or inflammation in the area of the varicose veins of  your leg(s)?  [] Yes  [x] No    [] Yes   [] No   Do you have darkened or inflamed skin on your legs?   [x] Yes   [] No   [] Yes   [] No   Do you have leg swelling?     [x] Yes   [] No   [] Yes   [] No   Do you have leg pain?   [x] Yes   [] No   [] Yes   [] No   If yes, describe the type of pain?    [x]   Stabbing [x]  Radiating [x]  Aching   [x]  Tightness [x]  Throbbing               [x]  Burning [x]  Cramping              Do you have leg discomfort?   [x] Yes   [] No   [] Yes   [] No   If yes, describe the type of discomfort?    [x]  Heaviness [x]   Fullness   [x]  Restlessness [x] Tired/Fatigued [x] Itching              Have you ever worn compression hose?   [x] Yes   [] No   [] Yes   [] No   If yes, how long? ON AND OFF 15+ YEARS       Do you elevate your legs while sitting?   [x] Yes   [] No   [] Yes   [] No   Does venous disease (varicose veins, ulcers, skin changes, leg pain/swelling) interfere with your daily life?  If yes, check activities you are limited or unable to do.    [x]  Shower  [x]   Walk  []  Exercise  [] Play with children/grandchildren  []  Shop [] Work [x] Stand for any period of time [x] Sleep                               [x] Sitting for an extended period of time.           [x] Yes   [] No   [] Yes   [] No   Do you exercise/have you tried to exercise (i.e.  Walk our participate in a regular exercise routine)?  [x] Yes  [] No   [] Yes   [] No   BMI   31.22           Past Medical History:   Diagnosis Date    Breast cancer     GERD (gastroesophageal reflux disease)     History of vertebral fracture     Hypothyroidism, unspecified     OA (osteoarthritis) of knee     Type 2 diabetes mellitus without complications         Past Surgical History:   Procedure Laterality Date    APPENDECTOMY      CATARACT EXTRACTION, BILATERAL      KNEE ARTHROSCOPY Right     MASTECTOMY, PARTIAL, WITH AXILLARY LYMPHADENECTOMY      OOPHORECTOMY Right     PARTIAL HYSTERECTOMY      REMOVAL OF BONE SPUR OF FOOT Right     TOTAL KNEE ARTHROPLASTY Left 11/2023    TUBAL LIGATION         Social History     Tobacco Use   Smoking Status Never    Passive exposure: Never   Smokeless Tobacco Never         Current Outpatient Medications:     anastrozole (ARIMIDEX) 1 mg Tab, Take 1 mg by mouth once daily., Disp: , Rfl:     aspirin (ECOTRIN) 81 MG EC tablet, Take 81 mg by mouth., Disp: , Rfl:     atorvastatin (LIPITOR) 20 MG tablet, Take 20 mg by mouth every evening., Disp: , Rfl:     cyclobenzaprine (FLEXERIL) 10 MG tablet, Take 1 tablet (10 mg total) by mouth every evening.,  Disp: 90 tablet, Rfl: 1    gabapentin (NEURONTIN) 100 MG capsule, Take 1 capsule (100 mg total) by mouth 3 (three) times daily., Disp: 270 capsule, Rfl: 0    HYDROcodone-acetaminophen (NORCO)  mg per tablet, Take 1 tablet by mouth every 8 (eight) hours as needed. PRN, Disp: , Rfl:     insulin degludec (TRESIBA FLEXTOUCH U-200) 200 unit/mL (3 mL) insulin pen, Inject 24 Units into the skin once daily., Disp: 2 pen , Rfl: 7    insulin regular (NOVOLIN R REGULAR U100 INSULIN) 100 unit/mL Inj injection, Inject 4 Units into the skin once daily., Disp: 3 mL, Rfl: 11    JANUVIA 100 mg Tab, Take 1 tablet (100 mg total) by mouth once daily., Disp: 90 tablet, Rfl: 0    levoFLOXacin (LEVAQUIN) 500 MG tablet, Take 500 mg by mouth., Disp: , Rfl:     levothyroxine (SYNTHROID) 137 MCG Tab tablet, Take 137 mcg by mouth once daily., Disp: , Rfl:     metFORMIN (GLUCOPHAGE-XR) 500 MG ER 24hr tablet, Take 1 tablet (500 mg total) by mouth 3 (three) times daily., Disp: 90 tablet, Rfl: 5    omeprazole (PRILOSEC) 40 MG capsule, Take 1 capsule by mouth once daily., Disp: , Rfl:     Review of Systems   Constitutional:  Negative for activity change, chills, diaphoresis, fatigue and fever.   Respiratory:  Negative for cough and shortness of breath.    Cardiovascular:  Positive for leg swelling. Negative for chest pain and claudication.        Hyperpigmentation LE   Gastrointestinal:  Negative for nausea and vomiting.   Musculoskeletal:  Positive for leg pain. Negative for joint swelling.   Integumentary:  Negative for rash and wound.   Neurological:  Negative for weakness and numbness.          II. PHYSICAL EXAM     Physical Exam  Constitutional:       General: She is awake. She is not in acute distress.     Appearance: Normal appearance. She is obese. She is not ill-appearing or toxic-appearing.   HENT:      Head: Normocephalic and atraumatic.   Eyes:      Extraocular Movements: Extraocular movements intact.      Conjunctiva/sclera:  Conjunctivae normal.      Pupils: Pupils are equal, round, and reactive to light.   Neck:      Vascular: No carotid bruit or JVD.   Cardiovascular:      Rate and Rhythm: Normal rate and regular rhythm.      Pulses:           Dorsalis pedis pulses are detected w/ Doppler on the right side and detected w/ Doppler on the left side.        Posterior tibial pulses are detected w/ Doppler on the right side and detected w/ Doppler on the left side.      Heart sounds: No murmur heard.  Pulmonary:      Effort: Pulmonary effort is normal. No respiratory distress.      Breath sounds: No stridor. No wheezing, rhonchi or rales.   Musculoskeletal:         General: No swelling, tenderness or deformity.      Right lower le+ Edema present.      Left lower le+ Edema present.      Comments: No evidence of cellulitis, weeping or open ulceration bilaterally.   Feet:      Comments: Triphasic hand-held dopplerable pulses of the bilateral dorsalis pedis and posterior tibial arteries.  Skin:     General: Skin is warm.      Capillary Refill: Capillary refill takes less than 2 seconds.      Coloration: Skin is not ashen.      Findings: No bruising, erythema, lesion, rash or wound.   Neurological:      Mental Status: She is alert and oriented to person, place, and time.      Motor: No weakness.   Psychiatric:         Speech: Speech normal.         Behavior: Behavior normal. Behavior is cooperative.         Reticular/Spider veins noted:  RLE: anterior calf, medial calf, posterior calf, and lateral calf  LLE: anterior calf, medial calf, posterior calf, and lateral calf    Varicose veins noted:  RLE: anterior calf, medial calf, posterior calf, and lateral calf  LLE:  anterior calf, medial calf, posterior calf, and lateral calf    CEAP Classification  Clinical Signs: Class 3 - Edema  Etiologic Classification: Primary  Anatomic distribution: Superficial  Pathophysiologic dysfunction: Reflux                         Venous Clinical Severity  Score  Pain:2=Daily, moderate activity limitation, occasional analgesics  Varicose Veins: 2=Multiple. GS varicose veins confined to calf or thigh  Venous Edema: 1=Evening ankle edema only  Pigmentation: 0=None or focal, low intensity (tan)  Inflammation: 0=None  Induration: 0=None  Number of Active Ulcers: 0=0  Active Ulceration, Duration: 0=None  Active Ulcer Size: 0=None  Compressive Therapy: 3=Full compliance, stockings + elevation  Total Score: 8       III. ASSESSMENT & PLAN (MEDICAL DECISION MAKING)     1. Venous insufficiency    2. Varicose veins of bilateral lower extremities with pain    3. Leg pain, bilateral    4. Edema, lower extremity        Assessment/Diagnosis and Plan:  Patient has complaints, symptoms and physical exam findings of chronic venous disease.  Therefore, I will order a bilateral complete venous reflux study and see the patient back with results.    Orders Placed This Encounter    US Venous Reflux Study Bilateral          Felix Padron DO

## 2024-09-27 PROBLEM — M79.605 LEG PAIN, BILATERAL: Status: ACTIVE | Noted: 2024-09-27

## 2024-09-27 PROBLEM — I87.2 VENOUS INSUFFICIENCY: Status: ACTIVE | Noted: 2024-09-27

## 2024-09-27 PROBLEM — R60.0 EDEMA, LOWER EXTREMITY: Status: ACTIVE | Noted: 2024-09-27

## 2024-09-27 PROBLEM — M79.604 LEG PAIN, BILATERAL: Status: ACTIVE | Noted: 2024-09-27

## 2024-10-07 ENCOUNTER — TELEPHONE (OUTPATIENT)
Dept: FAMILY MEDICINE | Facility: CLINIC | Age: 74
End: 2024-10-07
Payer: MEDICARE

## 2024-10-07 DIAGNOSIS — B02.8 HERPES ZOSTER WITH COMPLICATION: Primary | ICD-10-CM

## 2024-10-07 NOTE — TELEPHONE ENCOUNTER
Pt called wanting rx acyclovir.  Says she thinks she has shingles.  [facility]

## 2024-10-10 RX ORDER — ACYCLOVIR 800 MG/1
800 TABLET ORAL 2 TIMES DAILY
Qty: 60 TABLET | Refills: 11 | Status: SHIPPED | OUTPATIENT
Start: 2024-10-10

## 2024-10-22 ENCOUNTER — HOSPITAL ENCOUNTER (OUTPATIENT)
Dept: RADIOLOGY | Facility: HOSPITAL | Age: 74
Discharge: HOME | End: 2024-10-22
Attending: FAMILY MEDICINE
Payer: MEDICARE

## 2024-10-22 ENCOUNTER — OFFICE VISIT (OUTPATIENT)
Dept: VASCULAR SURGERY | Facility: CLINIC | Age: 74
End: 2024-10-22
Attending: FAMILY MEDICINE
Payer: MEDICARE

## 2024-10-22 VITALS
RESPIRATION RATE: 18 BRPM | SYSTOLIC BLOOD PRESSURE: 119 MMHG | HEART RATE: 91 BPM | HEIGHT: 66 IN | BODY MASS INDEX: 31.07 KG/M2 | WEIGHT: 193.31 LBS | DIASTOLIC BLOOD PRESSURE: 63 MMHG

## 2024-10-22 DIAGNOSIS — M79.604 LEG PAIN, BILATERAL: ICD-10-CM

## 2024-10-22 DIAGNOSIS — R60.0 EDEMA, LOWER EXTREMITY: ICD-10-CM

## 2024-10-22 DIAGNOSIS — I87.2 VENOUS INSUFFICIENCY: Primary | ICD-10-CM

## 2024-10-22 DIAGNOSIS — I83.813 VARICOSE VEINS OF BILATERAL LOWER EXTREMITIES WITH PAIN: ICD-10-CM

## 2024-10-22 DIAGNOSIS — I87.2 VENOUS INSUFFICIENCY (CHRONIC) (PERIPHERAL): ICD-10-CM

## 2024-10-22 DIAGNOSIS — M79.605 LEG PAIN, BILATERAL: ICD-10-CM

## 2024-10-22 PROCEDURE — 93970 EXTREMITY STUDY: CPT | Mod: TC

## 2024-10-22 PROCEDURE — 3074F SYST BP LT 130 MM HG: CPT | Mod: CPTII,,, | Performed by: FAMILY MEDICINE

## 2024-10-22 PROCEDURE — 93970 EXTREMITY STUDY: CPT | Mod: 26,,, | Performed by: FAMILY MEDICINE

## 2024-10-22 PROCEDURE — 99214 OFFICE O/P EST MOD 30 MIN: CPT | Mod: S$PBB,,, | Performed by: FAMILY MEDICINE

## 2024-10-22 PROCEDURE — 1160F RVW MEDS BY RX/DR IN RCRD: CPT | Mod: CPTII,,, | Performed by: FAMILY MEDICINE

## 2024-10-22 PROCEDURE — 1159F MED LIST DOCD IN RCRD: CPT | Mod: CPTII,,, | Performed by: FAMILY MEDICINE

## 2024-10-22 PROCEDURE — 99214 OFFICE O/P EST MOD 30 MIN: CPT | Mod: PBBFAC,25 | Performed by: FAMILY MEDICINE

## 2024-10-22 PROCEDURE — 99999 PR PBB SHADOW E&M-EST. PATIENT-LVL IV: CPT | Mod: PBBFAC,,, | Performed by: FAMILY MEDICINE

## 2024-10-22 PROCEDURE — 3078F DIAST BP <80 MM HG: CPT | Mod: CPTII,,, | Performed by: FAMILY MEDICINE

## 2024-10-22 RX ORDER — SODIUM CHLORIDE 9 MG/ML
INJECTION, SOLUTION INTRAVENOUS CONTINUOUS
OUTPATIENT
Start: 2024-12-13

## 2024-10-22 RX ORDER — SODIUM CHLORIDE 9 MG/ML
INJECTION, SOLUTION INTRAVENOUS CONTINUOUS
OUTPATIENT
Start: 2024-12-06

## 2024-10-22 RX ORDER — SODIUM CHLORIDE 9 MG/ML
INJECTION, SOLUTION INTRAVENOUS CONTINUOUS
OUTPATIENT
Start: 2024-12-20

## 2024-10-22 NOTE — PATIENT INSTRUCTIONS
Pre Prodedure Information    Procedure (s) and Date (s):  1. Left GSV VenaSeal Ablationw w/Left calf  VenaSeal ablation - 12/06/2024      2. Left SSV VenaSeal Ablation - 12/13/2024      3. Right Right GSV VenaSeal Ablation w/Right Calf  VenaSeal Ablation - 12/20/2024  Do not eat or drink anything after midnight.  You may take medication Synthroid  with a small sip of water morning of each procedure.  We will call you the day prior with your time to report for surgery.  You will check in at Ambulatory Surgery on the Ground Floor.  Shower prior to procedure as your leg will be wrapped for 48 hours and you will not be able to shower.  Do not wear lotion, oil, or cream on you legs on the day of your procedure.  This will cause the Doctor's dawson to fade.  Wear shorts, skirt, or loose pants and larger shoes (house shoes).   Bring a pillow or two and leave in the car (to prop your leg).  Bring someone with you to drive, stay during the procedure, and drive you home.  Someone will also need to stay with you at home the first night.  No driving for 24 hours after sedation.  Prepare to walk 15 minutes out of each hour for the first 48 hours post op.  Prepare to keep your legs propped up while sitting for the first 48 hours or 2 days following your procedure (recliner, couch, or chair).  Dressings will remain on your legs for at least 48 hours or 2 days after procedure.  Full discharge instructions will be provided on the day of your procedure.  Typically, you are in and out within a few hours.  We will follow you postoperatively with a 4 day post ablation ultrasound and then a 1 month, 3 month, 6 month, and 1 year post ablation visit with the physician or nurse practitioner with or without an ultrasound.  Please make every effort to attend these appointments as they will be essential to following you progress.  Please kindly notify us as soon as possible if you need to reschedule your appointment.  As  always, we look forward to caring for you and are happy to answer your questions.  Please call us at [phone].

## 2024-10-22 NOTE — PROGRESS NOTES
VEIN CENTER CLINIC NOTE    Patient ID: Margarita Turner is a 74 y.o. female.    I. HISTORY     Chief Complaint:   Chief Complaint   Patient presents with    Follow-up     US KRYS COMP RESULTS        HPI: Margarita Turner is a 74 y.o. female who presents today for follow-up and results to a bilateral complete venous reflux study performed today 10/22/2024 and the results were discussed with the patient.  This study shows no evidence of DVT bilaterally.  The study also shows dilation and axial reflux of the bilateral great, left small saphenous vein and bilateral calf pathological perforators.    The patient was initially seen on 09/24/2024 by referral from Dr. Jean Carlos Berger for evaluation of lower extremity edema, varicose veins and pain.  Symptoms are worsening/persistent and began greater than 2 years ago.  Location is bilateral lower extremities below the knees. Symptoms are worse at the end of the day.  History of venous interventions includes none.  Denies family history of venous disease.  Denies history of DVT or cellulitis.  She states she has been wearing compression stockings for about 30-40 years.  She states that she continues to have persistent symptoms despite these measures and considers them life altering and would like to have the underlying condition corrected if possible.    The patient had a reflux study performed on 06/25/2024 at a [facility] which noted reflux in the bilateral great saphenous veins as well as bilateral pathological perforators.  No DVTs bilaterally.  No deep venous reflux.  No reflux and small saphenous veins.    Venous Disease Medical Necessity Documentation Initial Visit Date:  09/24/2024 Return Check Date:    Have you ever had a rupture or bleed from a varicose vein in your leg(s)?              [] Yes  [x] No   [] Yes   [] No   Have you ever been diagnosed with phlebitis, cellulitis, or inflammation in the area of the varicose veins of  your leg(s)?  []  Yes  [x] No    [] Yes   [] No   Do you have darkened or inflamed skin on your legs?   [x] Yes   [] No   [] Yes   [] No   Do you have leg swelling?     [x] Yes   [] No   [] Yes   [] No   Do you have leg pain?   [x] Yes   [] No   [] Yes   [] No   If yes, describe the type of pain?    [x]   Stabbing [x]  Radiating [x]  Aching   [x]  Tightness [x]  Throbbing               [x]  Burning [x]  Cramping              Do you have leg discomfort?   [x] Yes   [] No   [] Yes   [] No   If yes, describe the type of discomfort?    [x]  Heaviness [x]  Fullness   [x]  Restlessness [x] Tired/Fatigued [x] Itching              Have you ever worn compression hose?   [x] Yes   [] No   [] Yes   [] No   If yes, how long? ON AND OFF 15+ YEARS       Do you elevate your legs while sitting?   [x] Yes   [] No   [] Yes   [] No   Does venous disease (varicose veins, ulcers, skin changes, leg pain/swelling) interfere with your daily life?  If yes, check activities you are limited or unable to do.    [x]  Shower  [x]   Walk  []  Exercise  [] Play with children/grandchildren  []  Shop [] Work [x] Stand for any period of time [x] Sleep                               [x] Sitting for an extended period of time.           [x] Yes   [] No   [] Yes   [] No   Do you exercise/have you tried to exercise (i.e.  Walk our participate in a regular exercise routine)?  [x] Yes  [] No   [] Yes   [] No   BMI   31.22           Past Medical History:   Diagnosis Date    Breast cancer     GERD (gastroesophageal reflux disease)     History of vertebral fracture     Hypothyroidism, unspecified     OA (osteoarthritis) of knee     Type 2 diabetes mellitus without complications         Past Surgical History:   Procedure Laterality Date    APPENDECTOMY      CATARACT EXTRACTION, BILATERAL      KNEE ARTHROSCOPY Right     MASTECTOMY, PARTIAL, WITH AXILLARY LYMPHADENECTOMY      OOPHORECTOMY Right     PARTIAL HYSTERECTOMY      REMOVAL OF BONE SPUR OF FOOT Right     TOTAL KNEE  ARTHROPLASTY Left 11/2023    TUBAL LIGATION         Social History     Tobacco Use   Smoking Status Never    Passive exposure: Never   Smokeless Tobacco Never         Current Outpatient Medications:     acyclovir (ZOVIRAX) 800 MG Tab, Take 1 tablet (800 mg total) by mouth 2 (two) times daily. As needed for shingles, Disp: 60 tablet, Rfl: 11    anastrozole (ARIMIDEX) 1 mg Tab, Take 1 mg by mouth once daily., Disp: , Rfl:     aspirin (ECOTRIN) 81 MG EC tablet, Take 81 mg by mouth., Disp: , Rfl:     atorvastatin (LIPITOR) 20 MG tablet, Take 20 mg by mouth every evening., Disp: , Rfl:     cyclobenzaprine (FLEXERIL) 10 MG tablet, Take 1 tablet (10 mg total) by mouth every evening., Disp: 90 tablet, Rfl: 1    gabapentin (NEURONTIN) 100 MG capsule, Take 1 capsule (100 mg total) by mouth 3 (three) times daily., Disp: 270 capsule, Rfl: 0    HYDROcodone-acetaminophen (NORCO)  mg per tablet, Take 1 tablet by mouth every 8 (eight) hours as needed. PRN, Disp: , Rfl:     insulin degludec (TRESIBA FLEXTOUCH U-200) 200 unit/mL (3 mL) insulin pen, Inject 24 Units into the skin once daily., Disp: 2 pen , Rfl: 7    insulin regular (NOVOLIN R REGULAR U100 INSULIN) 100 unit/mL Inj injection, Inject 4 Units into the skin once daily., Disp: 3 mL, Rfl: 11    JANUVIA 100 mg Tab, Take 1 tablet (100 mg total) by mouth once daily., Disp: 90 tablet, Rfl: 0    levoFLOXacin (LEVAQUIN) 500 MG tablet, Take 500 mg by mouth., Disp: , Rfl:     levothyroxine (SYNTHROID) 137 MCG Tab tablet, Take 137 mcg by mouth once daily., Disp: , Rfl:     metFORMIN (GLUCOPHAGE-XR) 500 MG ER 24hr tablet, Take 1 tablet (500 mg total) by mouth 3 (three) times daily., Disp: 90 tablet, Rfl: 5    omeprazole (PRILOSEC) 40 MG capsule, Take 1 capsule by mouth once daily., Disp: , Rfl:     Review of Systems   Constitutional:  Negative for activity change, chills, diaphoresis, fatigue and fever.   Respiratory:  Negative for cough and shortness of breath.     Cardiovascular:  Positive for leg swelling. Negative for chest pain and claudication.        Hyperpigmentation LE   Gastrointestinal:  Negative for nausea and vomiting.   Musculoskeletal:  Positive for leg pain. Negative for joint swelling.   Integumentary:  Negative for rash and wound.   Neurological:  Negative for weakness and numbness.          II. PHYSICAL EXAM     Physical Exam  Constitutional:       General: She is awake. She is not in acute distress.     Appearance: Normal appearance. She is obese. She is not ill-appearing or toxic-appearing.   HENT:      Head: Normocephalic and atraumatic.   Eyes:      Extraocular Movements: Extraocular movements intact.      Conjunctiva/sclera: Conjunctivae normal.      Pupils: Pupils are equal, round, and reactive to light.   Neck:      Vascular: No carotid bruit or JVD.   Cardiovascular:      Rate and Rhythm: Normal rate and regular rhythm.      Pulses:           Dorsalis pedis pulses are detected w/ Doppler on the right side and detected w/ Doppler on the left side.        Posterior tibial pulses are detected w/ Doppler on the right side and detected w/ Doppler on the left side.      Heart sounds: No murmur heard.  Pulmonary:      Effort: Pulmonary effort is normal. No respiratory distress.      Breath sounds: No stridor. No wheezing, rhonchi or rales.   Musculoskeletal:         General: No swelling, tenderness or deformity.      Right lower le+ Edema present.      Left lower le+ Edema present.      Comments: No evidence of cellulitis, weeping or open ulceration bilaterally.   Feet:      Comments: Triphasic hand-held dopplerable pulses of the bilateral dorsalis pedis and posterior tibial arteries.  Skin:     General: Skin is warm.      Capillary Refill: Capillary refill takes less than 2 seconds.      Coloration: Skin is not ashen.      Findings: No bruising, erythema, lesion, rash or wound.   Neurological:      Mental Status: She is alert and oriented to  person, place, and time.      Motor: No weakness.   Psychiatric:         Speech: Speech normal.         Behavior: Behavior normal. Behavior is cooperative.         Reticular/Spider veins noted:  RLE: anterior calf, medial calf, posterior calf, and lateral calf  LLE: anterior calf, medial calf, posterior calf, and lateral calf    Varicose veins noted:  RLE: anterior calf, medial calf, posterior calf, and lateral calf  LLE:  anterior calf, medial calf, posterior calf, and lateral calf    CEAP Classification  Clinical Signs: Class 3 - Edema  Etiologic Classification: Primary  Anatomic distribution: Superficial  Pathophysiologic dysfunction: Reflux                         Venous Clinical Severity Score  Pain:2=Daily, moderate activity limitation, occasional analgesics  Varicose Veins: 2=Multiple. GS varicose veins confined to calf or thigh  Venous Edema: 1=Evening ankle edema only  Pigmentation: 0=None or focal, low intensity (tan)  Inflammation: 0=None  Induration: 0=None  Number of Active Ulcers: 0=0  Active Ulceration, Duration: 0=None  Active Ulcer Size: 0=None  Compressive Therapy: 3=Full compliance, stockings + elevation  Total Score: 8       III. ASSESSMENT & PLAN (MEDICAL DECISION MAKING)     1. Venous insufficiency    2. Varicose veins of bilateral lower extremities with pain    3. Leg pain, bilateral    4. Edema, lower extremity          Assessment/Diagnosis and Plan:  The patient continues to have sequela of chronic venous insufficiency despite over 3 months of conservative therapy. The patient continues to have life altering symptoms as well as a positive reflux study as noted in the history of present illness above. At this time I believe it would be reasonable to proceed with bilateral great saphenous vein , left small saphenous vein and bilateral pathological calf  veins, non thermal adhesive endovenous ablations for symptomatic venous insufficiency.  Untreated venous disease increases the  patient's risk for cellulitis, deep vein thrombosis, chronic skin changes and venous ulceration.  Risk and benefits of the procedure were explained to the patient and the patient wishes to proceed. The patient does not have overly distended veins and denies history of DVT/PE and will not require prophylactic anticoagulation.              Felix Padron, DO

## 2024-10-29 ENCOUNTER — OFFICE VISIT (OUTPATIENT)
Dept: FAMILY MEDICINE | Facility: CLINIC | Age: 74
End: 2024-10-29
Payer: MEDICARE

## 2024-10-29 VITALS
BODY MASS INDEX: 31.98 KG/M2 | OXYGEN SATURATION: 97 % | DIASTOLIC BLOOD PRESSURE: 67 MMHG | WEIGHT: 199 LBS | HEIGHT: 66 IN | HEART RATE: 95 BPM | SYSTOLIC BLOOD PRESSURE: 103 MMHG | TEMPERATURE: 98 F

## 2024-10-29 DIAGNOSIS — R30.0 DYSURIA: Primary | ICD-10-CM

## 2024-10-29 DIAGNOSIS — E11.9 TYPE 2 DIABETES MELLITUS WITHOUT COMPLICATION, WITH LONG-TERM CURRENT USE OF INSULIN: Chronic | ICD-10-CM

## 2024-10-29 DIAGNOSIS — J32.0 CHRONIC MAXILLARY SINUSITIS: Chronic | ICD-10-CM

## 2024-10-29 DIAGNOSIS — G62.9 NEUROPATHY: ICD-10-CM

## 2024-10-29 DIAGNOSIS — N39.0 URINARY TRACT INFECTION WITHOUT HEMATURIA, SITE UNSPECIFIED: ICD-10-CM

## 2024-10-29 DIAGNOSIS — Z79.4 TYPE 2 DIABETES MELLITUS WITHOUT COMPLICATION, WITH LONG-TERM CURRENT USE OF INSULIN: Chronic | ICD-10-CM

## 2024-10-29 DIAGNOSIS — Z23 IMMUNIZATION DUE: ICD-10-CM

## 2024-10-29 DIAGNOSIS — I83.813 VARICOSE VEINS OF BILATERAL LOWER EXTREMITIES WITH PAIN: Chronic | ICD-10-CM

## 2024-10-29 LAB
BILIRUB SERPL-MCNC: ABNORMAL MG/DL
BLOOD URINE, POC: ABNORMAL
CLARITY, UA: ABNORMAL
COLOR, UA: YELLOW
GLUCOSE UR QL STRIP: ABNORMAL
KETONES UR QL STRIP: ABNORMAL
LEUKOCYTE ESTERASE URINE, POC: ABNORMAL
NITRITE, POC UA: POSITIVE
PH, POC UA: 5.5
PROTEIN, POC: 30
SPECIFIC GRAVITY, POC UA: 1.02
UROBILINOGEN, POC UA: 0.2

## 2024-10-29 PROCEDURE — 3074F SYST BP LT 130 MM HG: CPT | Mod: ,,, | Performed by: FAMILY MEDICINE

## 2024-10-29 PROCEDURE — 99214 OFFICE O/P EST MOD 30 MIN: CPT | Mod: 25,,, | Performed by: FAMILY MEDICINE

## 2024-10-29 PROCEDURE — 1159F MED LIST DOCD IN RCRD: CPT | Mod: ,,, | Performed by: FAMILY MEDICINE

## 2024-10-29 PROCEDURE — 90480 ADMN SARSCOV2 VAC 1/ONLY CMP: CPT | Mod: ,,, | Performed by: FAMILY MEDICINE

## 2024-10-29 PROCEDURE — 81003 URINALYSIS AUTO W/O SCOPE: CPT | Mod: QW,,, | Performed by: FAMILY MEDICINE

## 2024-10-29 PROCEDURE — 1160F RVW MEDS BY RX/DR IN RCRD: CPT | Mod: ,,, | Performed by: FAMILY MEDICINE

## 2024-10-29 PROCEDURE — 91322 SARSCOV2 VAC 50 MCG/0.5ML IM: CPT | Mod: ,,, | Performed by: FAMILY MEDICINE

## 2024-10-29 PROCEDURE — 90653 IIV ADJUVANT VACCINE IM: CPT | Mod: ,,, | Performed by: FAMILY MEDICINE

## 2024-10-29 PROCEDURE — 1126F AMNT PAIN NOTED NONE PRSNT: CPT | Mod: ,,, | Performed by: FAMILY MEDICINE

## 2024-10-29 PROCEDURE — 3078F DIAST BP <80 MM HG: CPT | Mod: ,,, | Performed by: FAMILY MEDICINE

## 2024-10-29 PROCEDURE — 1101F PT FALLS ASSESS-DOCD LE1/YR: CPT | Mod: ,,, | Performed by: FAMILY MEDICINE

## 2024-10-29 PROCEDURE — G0008 ADMIN INFLUENZA VIRUS VAC: HCPCS | Mod: ,,, | Performed by: FAMILY MEDICINE

## 2024-10-29 PROCEDURE — 3288F FALL RISK ASSESSMENT DOCD: CPT | Mod: ,,, | Performed by: FAMILY MEDICINE

## 2024-10-29 RX ORDER — BLOOD-GLUCOSE SENSOR
EACH MISCELLANEOUS
Qty: 4 EACH | Refills: 5 | Status: SHIPPED | OUTPATIENT
Start: 2024-10-29

## 2024-10-29 RX ORDER — SULFAMETHOXAZOLE AND TRIMETHOPRIM 800; 160 MG/1; MG/1
1 TABLET ORAL 2 TIMES DAILY
Qty: 14 TABLET | Refills: 0 | Status: SHIPPED | OUTPATIENT
Start: 2024-10-29

## 2024-10-29 RX ORDER — GABAPENTIN 100 MG/1
100 CAPSULE ORAL 3 TIMES DAILY
Qty: 270 CAPSULE | Refills: 0 | Status: SHIPPED | OUTPATIENT
Start: 2024-10-29

## 2024-10-29 RX ORDER — FLASH GLUCOSE SCANNING READER
EACH MISCELLANEOUS
Qty: 1 EACH | Refills: 0 | Status: SHIPPED | OUTPATIENT
Start: 2024-10-29

## 2024-11-14 RX ORDER — CYCLOBENZAPRINE HCL 10 MG
10 TABLET ORAL NIGHTLY
Qty: 90 TABLET | Refills: 1 | Status: SHIPPED | OUTPATIENT
Start: 2024-11-14

## 2024-11-18 ENCOUNTER — OFFICE VISIT (OUTPATIENT)
Dept: FAMILY MEDICINE | Facility: CLINIC | Age: 74
End: 2024-11-18
Payer: MEDICARE

## 2024-11-18 VITALS
WEIGHT: 202 LBS | HEIGHT: 66 IN | BODY MASS INDEX: 32.47 KG/M2 | HEART RATE: 91 BPM | SYSTOLIC BLOOD PRESSURE: 117 MMHG | DIASTOLIC BLOOD PRESSURE: 71 MMHG

## 2024-11-18 DIAGNOSIS — J30.2 SEASONAL ALLERGIES: Primary | ICD-10-CM

## 2024-11-18 PROCEDURE — 1126F AMNT PAIN NOTED NONE PRSNT: CPT | Mod: ,,, | Performed by: FAMILY MEDICINE

## 2024-11-18 PROCEDURE — 99213 OFFICE O/P EST LOW 20 MIN: CPT | Mod: 25,,, | Performed by: FAMILY MEDICINE

## 2024-11-18 PROCEDURE — 3078F DIAST BP <80 MM HG: CPT | Mod: ,,, | Performed by: FAMILY MEDICINE

## 2024-11-18 PROCEDURE — 3288F FALL RISK ASSESSMENT DOCD: CPT | Mod: ,,, | Performed by: FAMILY MEDICINE

## 2024-11-18 PROCEDURE — 1101F PT FALLS ASSESS-DOCD LE1/YR: CPT | Mod: ,,, | Performed by: FAMILY MEDICINE

## 2024-11-18 PROCEDURE — 96372 THER/PROPH/DIAG INJ SC/IM: CPT | Mod: ,,, | Performed by: FAMILY MEDICINE

## 2024-11-18 PROCEDURE — 3074F SYST BP LT 130 MM HG: CPT | Mod: ,,, | Performed by: FAMILY MEDICINE

## 2024-11-18 RX ORDER — DEXAMETHASONE SODIUM PHOSPHATE 4 MG/ML
4 INJECTION, SOLUTION INTRA-ARTICULAR; INTRALESIONAL; INTRAMUSCULAR; INTRAVENOUS; SOFT TISSUE
Status: COMPLETED | OUTPATIENT
Start: 2024-11-18 | End: 2024-11-18

## 2024-11-18 RX ORDER — LORATADINE 10 MG/1
10 TABLET ORAL DAILY
Qty: 90 TABLET | Refills: 3 | Status: SHIPPED | OUTPATIENT
Start: 2024-11-18 | End: 2025-11-18

## 2024-11-18 RX ORDER — AZITHROMYCIN 250 MG/1
TABLET, FILM COATED ORAL
Qty: 6 TABLET | Refills: 0 | Status: SHIPPED | OUTPATIENT
Start: 2024-11-18 | End: 2024-11-23

## 2024-11-18 RX ORDER — FLUTICASONE PROPIONATE 50 MCG
1 SPRAY, SUSPENSION (ML) NASAL DAILY
Qty: 11.1 ML | Refills: 11 | Status: SHIPPED | OUTPATIENT
Start: 2024-11-18 | End: 2025-11-18

## 2024-11-18 RX ADMIN — DEXAMETHASONE SODIUM PHOSPHATE 4 MG: 4 INJECTION, SOLUTION INTRA-ARTICULAR; INTRALESIONAL; INTRAMUSCULAR; INTRAVENOUS; SOFT TISSUE at 03:11

## 2024-11-18 NOTE — TELEPHONE ENCOUNTER
----- Message from Anabelle sent at 11/18/2024  8:55 AM CST -----  Refills for atorvastatin 20 mg cyclobenzatrine 10 mg

## 2024-11-19 RX ORDER — CYCLOBENZAPRINE HCL 10 MG
10 TABLET ORAL NIGHTLY
Qty: 90 TABLET | Refills: 0 | Status: SHIPPED | OUTPATIENT
Start: 2024-11-19

## 2024-11-19 RX ORDER — ATORVASTATIN CALCIUM 20 MG/1
20 TABLET, FILM COATED ORAL NIGHTLY
Qty: 90 TABLET | Refills: 0 | Status: SHIPPED | OUTPATIENT
Start: 2024-11-19

## 2024-11-19 NOTE — PROGRESS NOTES
"              Phani Huggins IV, DO  [facility]  [address] [place] [address]  Phone: [phone]      Subjective     Name: Margarita Turner   Sex: female  YOB: 1950 (74 y.o.)  MRN: [id number]  Visit Date: 11/19/2024   Chief Complaint: Sinus Problem and Sinusitis        HISTORY OF PRESENT ILLNESS:    Chief Complaint   Patient presents with    Sinus Problem    Sinusitis       HPI  See tests that keep you healthy and the problem oriented documentation below.    Tests to Keep You Healthy    Eye Exam: DUE      Portions of this note may have been created with voice recognition software. Occasional "wrong-word" or "sound-a-like" substitutions may have occurred due to the inherent limitations of voice recognition software. Please, read the note carefully and recognize, using context, where substitutions have occurred.     PAST MEDICAL HISTORY:  Significant Diagnoses - Patient  has a past medical history of Breast cancer, GERD (gastroesophageal reflux disease), History of vertebral fracture, Hypothyroidism, unspecified, OA (osteoarthritis) of knee, and Type 2 diabetes mellitus without complications.  Medications - Patient has a current medication list which includes the following long-term medication(s): acyclovir, aspirin, atorvastatin, freestyle mookie 3 sensor, gabapentin, insulin regular, januvia, levothyroxine, metformin, omeprazole, and loratadine.   Allergies - Patient has No Known Allergies.  Surgeries - Patient  has a past surgical history that includes mastectomy, partial, with axillary lymphadenectomy; Appendectomy; Tubal ligation; Partial hysterectomy; Oophorectomy (Right); Total knee arthroplasty (Left, 11/2023); Knee arthroscopy (Right); Removal of bone spur of foot (Right); and Cataract extraction, bilateral.  Family History - Patient family history includes Alcohol abuse in her father; Arthritis in her mother; Breast cancer in her mother; Colon cancer in her " brother; Diabetes in her brother, brother, and mother; Heart disease in her brother and mother; Hyperlipidemia in her brother, brother, father, mother, and sister; Hypertension in her brother, brother, father, mother, and sister; Leukemia in her brother; Stroke in her father.      SOCIAL HISTORY:  Tobacco - Patient  reports that she has never smoked. She has never been exposed to tobacco smoke. She has never used smokeless tobacco.   Alcohol - Patient  reports no history of alcohol use.   Recreational Drugs - Patient  reports no history of drug use.       Review of Systems   All other systems reviewed and are negative.         Past Medical History:   Diagnosis Date    Breast cancer     GERD (gastroesophageal reflux disease)     History of vertebral fracture     Hypothyroidism, unspecified     OA (osteoarthritis) of knee     Type 2 diabetes mellitus without complications         Review of patient's allergies indicates:  No Known Allergies     Past Surgical History:   Procedure Laterality Date    APPENDECTOMY      CATARACT EXTRACTION, BILATERAL      KNEE ARTHROSCOPY Right     MASTECTOMY, PARTIAL, WITH AXILLARY LYMPHADENECTOMY      OOPHORECTOMY Right     PARTIAL HYSTERECTOMY      REMOVAL OF BONE SPUR OF FOOT Right     TOTAL KNEE ARTHROPLASTY Left 11/2023    TUBAL LIGATION          Family History   Problem Relation Name Age of Onset    Heart disease Mother      Arthritis Mother      Hypertension Mother      Diabetes Mother      Breast cancer Mother      Hyperlipidemia Mother      Alcohol abuse Father      Hyperlipidemia Father      Stroke Father      Hypertension Father      Hyperlipidemia Sister      Hypertension Sister      Hyperlipidemia Brother Navi Amberly     Hypertension Brother Naiv Amberly     Diabetes Brother Navi Gaming     Colon cancer Brother Navi Amberly     Hyperlipidemia Brother      Hypertension Brother      Heart disease Brother      Diabetes Brother      Leukemia Brother         Current Outpatient Medications  "  Medication Instructions    acyclovir (ZOVIRAX) 800 mg, Oral, 2 times daily, As needed for shingles    anastrozole (ARIMIDEX) 1 mg, Daily    aspirin (ECOTRIN) 81 mg    atorvastatin (LIPITOR) 20 mg, Nightly    azithromycin (Z-VINITA) 250 MG tablet Take 2 tablets by mouth on day 1; Take 1 tablet by mouth on days 2-5      blood-glucose sensor (FREESTYLE TEJA 3 SENSOR) Myriam Use as directed to monitor blood sugar    cyclobenzaprine (FLEXERIL) 10 mg, Oral, Nightly    flash glucose scanning reader (FREESTYLE TEJA 14 DAY READER) Misc Use as directed to monitor blood sugar    fluticasone propionate (FLONASE) 50 mcg, Each Nostril, Daily    gabapentin (NEURONTIN) 100 mg, Oral, 3 times daily    HYDROcodone-acetaminophen (NORCO)  mg per tablet 1 tablet, Every 8 hours PRN    insulin degludec (TRESIBA FLEXTOUCH U-200) 24 Units, Subcutaneous, Daily    insulin regular (NOVOLIN R REGULAR U100 INSULIN) 4 Units, Subcutaneous, Daily    JANUVIA 100 mg, Oral, Daily    levothyroxine (SYNTHROID) 137 mcg, Daily    loratadine (CLARITIN) 10 mg, Oral, Daily    metFORMIN (GLUCOPHAGE-XR) 500 mg, Oral, 3 times daily    omeprazole (PRILOSEC) 40 MG capsule 1 capsule, Daily        Objective     /71   Pulse 91   Ht 5' 6" (1.676 m)   Wt 91.6 kg (202 lb)   BMI 32.60 kg/m²     Physical Exam  Constitutional:       General: She is not in acute distress.     Appearance: Normal appearance. She is not ill-appearing.   HENT:      Head: Normocephalic and atraumatic.      Right Ear: External ear normal.      Left Ear: External ear normal.   Eyes:      Extraocular Movements: Extraocular movements intact.      Conjunctiva/sclera: Conjunctivae normal.   Cardiovascular:      Rate and Rhythm: Normal rate.      Heart sounds: No murmur heard.  Pulmonary:      Effort: Pulmonary effort is normal.   Musculoskeletal:      Cervical back: Normal range of motion.   Skin:     General: Skin is warm and dry.      Coloration: Skin is not jaundiced.      Findings: " No rash.   Neurological:      Mental Status: She is alert.   Psychiatric:         Mood and Affect: Mood normal.          All recently obtained labs have been reviewed and discussed in detail with the patient.   Assessment     1. Seasonal allergies         Plan        Problem List Items Addressed This Visit    None  Visit Diagnoses       Seasonal allergies    -  Primary    Relevant Medications    loratadine (CLARITIN) 10 mg tablet    fluticasone propionate (FLONASE) 50 mcg/actuation nasal spray    dexAMETHasone injection 4 mg (Completed)    azithromycin (Z-VINITA) 250 MG tablet            No follow-ups on file.    Patient advised that is symptoms worsen, they should call or report directly to local emergency department.    Phani Huggins DO  [facility]

## 2024-11-25 ENCOUNTER — OFFICE VISIT (OUTPATIENT)
Dept: FAMILY MEDICINE | Facility: CLINIC | Age: 74
End: 2024-11-25
Payer: MEDICARE

## 2024-11-25 VITALS
WEIGHT: 202 LBS | HEIGHT: 66 IN | SYSTOLIC BLOOD PRESSURE: 108 MMHG | HEART RATE: 97 BPM | DIASTOLIC BLOOD PRESSURE: 67 MMHG | BODY MASS INDEX: 32.47 KG/M2

## 2024-11-25 DIAGNOSIS — R30.0 DYSURIA: Primary | ICD-10-CM

## 2024-11-25 PROCEDURE — 1101F PT FALLS ASSESS-DOCD LE1/YR: CPT | Mod: ,,, | Performed by: FAMILY MEDICINE

## 2024-11-25 PROCEDURE — 87077 CULTURE AEROBIC IDENTIFY: CPT | Mod: ,,, | Performed by: CLINICAL MEDICAL LABORATORY

## 2024-11-25 PROCEDURE — 99213 OFFICE O/P EST LOW 20 MIN: CPT | Mod: ,,, | Performed by: FAMILY MEDICINE

## 2024-11-25 PROCEDURE — 1159F MED LIST DOCD IN RCRD: CPT | Mod: ,,, | Performed by: FAMILY MEDICINE

## 2024-11-25 PROCEDURE — 3074F SYST BP LT 130 MM HG: CPT | Mod: ,,, | Performed by: FAMILY MEDICINE

## 2024-11-25 PROCEDURE — 87186 SC STD MICRODIL/AGAR DIL: CPT | Mod: ,,, | Performed by: CLINICAL MEDICAL LABORATORY

## 2024-11-25 PROCEDURE — 87086 URINE CULTURE/COLONY COUNT: CPT | Mod: ,,, | Performed by: CLINICAL MEDICAL LABORATORY

## 2024-11-25 PROCEDURE — 3078F DIAST BP <80 MM HG: CPT | Mod: ,,, | Performed by: FAMILY MEDICINE

## 2024-11-25 PROCEDURE — 1125F AMNT PAIN NOTED PAIN PRSNT: CPT | Mod: ,,, | Performed by: FAMILY MEDICINE

## 2024-11-25 PROCEDURE — 3288F FALL RISK ASSESSMENT DOCD: CPT | Mod: ,,, | Performed by: FAMILY MEDICINE

## 2024-11-25 RX ORDER — SULFAMETHOXAZOLE AND TRIMETHOPRIM 800; 160 MG/1; MG/1
1 TABLET ORAL 2 TIMES DAILY
Qty: 10 TABLET | Refills: 0 | Status: SHIPPED | OUTPATIENT
Start: 2024-11-25 | End: 2024-11-30

## 2024-11-28 LAB — UA COMPLETE W REFLEX CULTURE PNL UR: ABNORMAL

## 2024-12-02 DIAGNOSIS — N39.0 URINARY TRACT INFECTION WITHOUT HEMATURIA, SITE UNSPECIFIED: Primary | ICD-10-CM

## 2024-12-02 RX ORDER — NITROFURANTOIN 25; 75 MG/1; MG/1
100 CAPSULE ORAL 2 TIMES DAILY
Qty: 14 CAPSULE | Refills: 0 | Status: SHIPPED | OUTPATIENT
Start: 2024-12-02 | End: 2024-12-09

## 2024-12-04 DIAGNOSIS — I87.2 VENOUS INSUFFICIENCY: Primary | ICD-10-CM

## 2024-12-05 RX ORDER — LIDOCAINE HYDROCHLORIDE 10 MG/ML
2 INJECTION, SOLUTION INFILTRATION; PERINEURAL ONCE
Status: COMPLETED | OUTPATIENT
Start: 2024-12-06 | End: 2024-12-06

## 2024-12-06 ENCOUNTER — ANESTHESIA (OUTPATIENT)
Dept: PAIN MEDICINE | Facility: HOSPITAL | Age: 74
End: 2024-12-06
Payer: MEDICARE

## 2024-12-06 ENCOUNTER — HOSPITAL ENCOUNTER (OUTPATIENT)
Facility: HOSPITAL | Age: 74
Discharge: HOME | End: 2024-12-06
Attending: FAMILY MEDICINE | Admitting: FAMILY MEDICINE
Payer: MEDICARE

## 2024-12-06 ENCOUNTER — ANESTHESIA EVENT (OUTPATIENT)
Dept: PAIN MEDICINE | Facility: HOSPITAL | Age: 74
End: 2024-12-06
Payer: MEDICARE

## 2024-12-06 VITALS
HEART RATE: 79 BPM | TEMPERATURE: 97 F | WEIGHT: 199.81 LBS | BODY MASS INDEX: 31.36 KG/M2 | HEIGHT: 67 IN | OXYGEN SATURATION: 96 % | SYSTOLIC BLOOD PRESSURE: 108 MMHG | RESPIRATION RATE: 20 BRPM | DIASTOLIC BLOOD PRESSURE: 63 MMHG

## 2024-12-06 DIAGNOSIS — I87.2 VENOUS INSUFFICIENCY: Primary | ICD-10-CM

## 2024-12-06 LAB — GLUCOSE SERPL-MCNC: 137 MG/DL (ref 70–105)

## 2024-12-06 PROCEDURE — 36482 ENDOVEN THER CHEM ADHES 1ST: CPT | Mod: LT,,, | Performed by: FAMILY MEDICINE

## 2024-12-06 PROCEDURE — 63600175 PHARM REV CODE 636 W HCPCS: Performed by: FAMILY MEDICINE

## 2024-12-06 PROCEDURE — 36482 ENDOVEN THER CHEM ADHES 1ST: CPT | Mod: LT | Performed by: FAMILY MEDICINE

## 2024-12-06 PROCEDURE — 63600175 PHARM REV CODE 636 W HCPCS: Performed by: ANESTHESIOLOGY

## 2024-12-06 PROCEDURE — C1894 INTRO/SHEATH, NON-LASER: HCPCS | Performed by: FAMILY MEDICINE

## 2024-12-06 PROCEDURE — 37000008 HC ANESTHESIA 1ST 15 MINUTES: Performed by: FAMILY MEDICINE

## 2024-12-06 PROCEDURE — 82962 GLUCOSE BLOOD TEST: CPT

## 2024-12-06 PROCEDURE — 25000003 PHARM REV CODE 250: Performed by: FAMILY MEDICINE

## 2024-12-06 PROCEDURE — 36475 ENDOVENOUS RF 1ST VEIN: CPT | Performed by: FAMILY MEDICINE

## 2024-12-06 PROCEDURE — 27201423 OPTIME MED/SURG SUP & DEVICES STERILE SUPPLY: Performed by: FAMILY MEDICINE

## 2024-12-06 PROCEDURE — 37000009 HC ANESTHESIA EA ADD 15 MINS: Performed by: FAMILY MEDICINE

## 2024-12-06 RX ORDER — LIDOCAINE HYDROCHLORIDE 20 MG/ML
INJECTION, SOLUTION EPIDURAL; INFILTRATION; INTRACAUDAL; PERINEURAL
Status: DISCONTINUED | OUTPATIENT
Start: 2024-12-06 | End: 2024-12-06

## 2024-12-06 RX ORDER — PROPOFOL 10 MG/ML
VIAL (ML) INTRAVENOUS
Status: DISCONTINUED | OUTPATIENT
Start: 2024-12-06 | End: 2024-12-06

## 2024-12-06 RX ORDER — SODIUM CHLORIDE 9 MG/ML
INJECTION, SOLUTION INTRAVENOUS CONTINUOUS
Status: DISCONTINUED | OUTPATIENT
Start: 2024-12-06 | End: 2024-12-06 | Stop reason: HOSPADM

## 2024-12-06 RX ADMIN — PROPOFOL 50 MG: 10 INJECTION, EMULSION INTRAVENOUS at 09:12

## 2024-12-06 RX ADMIN — LIDOCAINE HYDROCHLORIDE 50 MG: 20 INJECTION, SOLUTION EPIDURAL; INFILTRATION; INTRACAUDAL; PERINEURAL at 09:12

## 2024-12-06 NOTE — DISCHARGE SUMMARY
[facility] we have [facility] - Pain Management  Discharge Note  Short Stay    Procedure(s) (LRB):  Left GSV w/ Left Pathological Calf  VenaSeal Ablation (Left)      OUTCOME: Patient tolerated treatment/procedure well without complication and is now ready for discharge.    DISPOSITION: Home or Self Care    FINAL DIAGNOSIS:  Venous insufficiency    FOLLOWUP: In clinic    DISCHARGE INSTRUCTIONS:  No discharge procedures on file.     TIME SPENT ON DISCHARGE: 5 minutes

## 2024-12-06 NOTE — TRANSFER OF CARE
"Anesthesia Transfer of Care Note    Patient: Margarita Turner    Procedure(s) Performed: Procedure(s) (LRB):  Left GSV w/ Left Pathological Calf  VenaSeal Ablation (Left)    Patient location: Other:    Anesthesia Type: MAC    Transport from OR: Transported from OR on room air with adequate spontaneous ventilation    Post pain: adequate analgesia    Post assessment: no apparent anesthetic complications and tolerated procedure well    Post vital signs: stable    Level of consciousness: responds to stimulation    Nausea/Vomiting: no nausea/vomiting    Complications: none    Transfer of care protocol was followedComments: Report Given to PACU rn VSS      Last vitals: Visit Vitals  BP (!) 90/40   Pulse 91   Temp 36.1 °C (97 °F)   Resp 20   Ht 5' 7" (1.702 m)   Wt 90.6 kg (199 lb 12.8 oz)   SpO2 96%   BMI 31.29 kg/m²     "

## 2024-12-06 NOTE — H&P
Patient ID: Margarita Turner is a 74 y.o. female.     I. HISTORY      Chief Complaint:        Chief Complaint   Patient presents with    Follow-up       US KRYS COMP RESULTS         HPI: Margarita Turner is a 74 y.o. female who presents for follow-up and results to a bilateral complete venous reflux study performed today 10/22/2024 and the results were discussed with the patient.  This study shows no evidence of DVT bilaterally.  The study also shows dilation and axial reflux of the bilateral great, left small saphenous vein and bilateral calf pathological perforators.     The patient was initially seen on 09/24/2024 by referral from Dr. Jean Carlos Berger for evaluation of lower extremity edema, varicose veins and pain.  Symptoms are worsening/persistent and began greater than 2 years ago.  Location is bilateral lower extremities below the knees. Symptoms are worse at the end of the day.  History of venous interventions includes none.  Denies family history of venous disease.  Denies history of DVT or cellulitis.  She states she has been wearing compression stockings for about 30-40 years.  She states that she continues to have persistent symptoms despite these measures and considers them life altering and would like to have the underlying condition corrected if possible.     The patient had a reflux [place] on [place] [facility] [place] [facility] [place] reflux in the bilateral great saphenous veins as well as bilateral pathological perforators.  No DVTs bilaterally.  No deep venous reflux.  No reflux and small saphenous veins.     Venous Disease Medical Necessity Documentation Initial Visit Date:  09/24/2024 Return Check Date:    Have you ever had a rupture or bleed from a varicose vein in your leg(s)?              [] Yes  [x] No   [] Yes   [] No   Have you ever been diagnosed with phlebitis, cellulitis, or inflammation in the area of the varicose veins of  your leg(s)?  [] Yes  [x] No    []  Yes   [] No   Do you have darkened or inflamed skin on your legs?   [x] Yes   [] No   [] Yes   [] No   Do you have leg swelling?      [x] Yes   [] No   [] Yes   [] No   Do you have leg pain?   [x] Yes   [] No   [] Yes   [] No   If yes, describe the type of pain?    [x]   Stabbing [x]  Radiating [x]  Aching   [x]  Tightness [x]  Throbbing               [x]  Burning [x]  Cramping               Do you have leg discomfort?   [x] Yes   [] No   [] Yes   [] No   If yes, describe the type of discomfort?    [x]  Heaviness [x]  Fullness   [x]  Restlessness [x] Tired/Fatigued [x] Itching               Have you ever worn compression hose?   [x] Yes   [] No   [] Yes   [] No   If yes, how long? ON AND OFF 15+ YEARS        Do you elevate your legs while sitting?   [x] Yes   [] No   [] Yes   [] No   Does venous disease (varicose veins, ulcers, skin changes, leg pain/swelling) interfere with your daily life?  If yes, check activities you are limited or unable to do.     [x]  Shower  [x]   Walk  []  Exercise  [] Play with children/grandchildren  []  Shop [] Work [x] Stand for any period of time [x] Sleep                               [x] Sitting for an extended period of time.              [x] Yes   [] No   [] Yes   [] No   Do you exercise/have you tried to exercise (i.e.  Walk our participate in a regular exercise routine)?  [x] Yes  [] No   [] Yes   [] No   BMI   31.22                   Past Medical History:   Diagnosis Date    Breast cancer      GERD (gastroesophageal reflux disease)      History of vertebral fracture      Hypothyroidism, unspecified      OA (osteoarthritis) of knee      Type 2 diabetes mellitus without complications                 Past Surgical History:   Procedure Laterality Date    APPENDECTOMY        CATARACT EXTRACTION, BILATERAL        KNEE ARTHROSCOPY Right      MASTECTOMY, PARTIAL, WITH AXILLARY LYMPHADENECTOMY        OOPHORECTOMY Right      PARTIAL HYSTERECTOMY        REMOVAL OF BONE SPUR OF FOOT Right       TOTAL KNEE ARTHROPLASTY Left 11/2023    TUBAL LIGATION             Tobacco Use History   Social History           Tobacco Use   Smoking Status Never    Passive exposure: Never   Smokeless Tobacco Never              Current Medications      Current Outpatient Medications:     acyclovir (ZOVIRAX) 800 MG Tab, Take 1 tablet (800 mg total) by mouth 2 (two) times daily. As needed for shingles, Disp: 60 tablet, Rfl: 11    anastrozole (ARIMIDEX) 1 mg Tab, Take 1 mg by mouth once daily., Disp: , Rfl:     aspirin (ECOTRIN) 81 MG EC tablet, Take 81 mg by mouth., Disp: , Rfl:     atorvastatin (LIPITOR) 20 MG tablet, Take 20 mg by mouth every evening., Disp: , Rfl:     cyclobenzaprine (FLEXERIL) 10 MG tablet, Take 1 tablet (10 mg total) by mouth every evening., Disp: 90 tablet, Rfl: 1    gabapentin (NEURONTIN) 100 MG capsule, Take 1 capsule (100 mg total) by mouth 3 (three) times daily., Disp: 270 capsule, Rfl: 0    HYDROcodone-acetaminophen (NORCO)  mg per tablet, Take 1 tablet by mouth every 8 (eight) hours as needed. PRN, Disp: , Rfl:     insulin degludec (TRESIBA FLEXTOUCH U-200) 200 unit/mL (3 mL) insulin pen, Inject 24 Units into the skin once daily., Disp: 2 pen , Rfl: 7    insulin regular (NOVOLIN R REGULAR U100 INSULIN) 100 unit/mL Inj injection, Inject 4 Units into the skin once daily., Disp: 3 mL, Rfl: 11    JANUVIA 100 mg Tab, Take 1 tablet (100 mg total) by mouth once daily., Disp: 90 tablet, Rfl: 0    levoFLOXacin (LEVAQUIN) 500 MG tablet, Take 500 mg by mouth., Disp: , Rfl:     levothyroxine (SYNTHROID) 137 MCG Tab tablet, Take 137 mcg by mouth once daily., Disp: , Rfl:     metFORMIN (GLUCOPHAGE-XR) 500 MG ER 24hr tablet, Take 1 tablet (500 mg total) by mouth 3 (three) times daily., Disp: 90 tablet, Rfl: 5    omeprazole (PRILOSEC) 40 MG capsule, Take 1 capsule by mouth once daily., Disp: , Rfl:         Review of Systems   Constitutional:  Negative for activity change, chills, diaphoresis, fatigue and  fever.   Respiratory:  Negative for cough and shortness of breath.    Cardiovascular:  Positive for leg swelling. Negative for chest pain and claudication.        Hyperpigmentation LE   Gastrointestinal:  Negative for nausea and vomiting.   Musculoskeletal:  Positive for leg pain. Negative for joint swelling.   Integumentary:  Negative for rash and wound.   Neurological:  Negative for weakness and numbness.            II. PHYSICAL EXAM      Physical Exam  Constitutional:       General: She is awake. She is not in acute distress.     Appearance: Normal appearance. She is obese. She is not ill-appearing or toxic-appearing.   HENT:      Head: Normocephalic and atraumatic.   Eyes:      Extraocular Movements: Extraocular movements intact.      Conjunctiva/sclera: Conjunctivae normal.      Pupils: Pupils are equal, round, and reactive to light.   Neck:      Vascular: No carotid bruit or JVD.   Cardiovascular:      Rate and Rhythm: Normal rate and regular rhythm.      Pulses:           Dorsalis pedis pulses are detected w/ Doppler on the right side and detected w/ Doppler on the left side.        Posterior tibial pulses are detected w/ Doppler on the right side and detected w/ Doppler on the left side.      Heart sounds: No murmur heard.  Pulmonary:      Effort: Pulmonary effort is normal. No respiratory distress.      Breath sounds: No stridor. No wheezing, rhonchi or rales.   Musculoskeletal:         General: No swelling, tenderness or deformity.      Right lower le+ Edema present.      Left lower le+ Edema present.      Comments: No evidence of cellulitis, weeping or open ulceration bilaterally.   Feet:      Comments: Triphasic hand-held dopplerable pulses of the bilateral dorsalis pedis and posterior tibial arteries.  Skin:     General: Skin is warm.      Capillary Refill: Capillary refill takes less than 2 seconds.      Coloration: Skin is not ashen.      Findings: No bruising, erythema, lesion, rash or wound.    Neurological:      Mental Status: She is alert and oriented to person, place, and time.      Motor: No weakness.   Psychiatric:         Speech: Speech normal.         Behavior: Behavior normal. Behavior is cooperative.            Reticular/Spider veins noted:  RLE: anterior calf, medial calf, posterior calf, and lateral calf  LLE: anterior calf, medial calf, posterior calf, and lateral calf     Varicose veins noted:  RLE: anterior calf, medial calf, posterior calf, and lateral calf  LLE:  anterior calf, medial calf, posterior calf, and lateral calf     CEAP Classification  Clinical Signs: Class 3 - Edema  Etiologic Classification: Primary  Anatomic distribution: Superficial  Pathophysiologic dysfunction: Reflux                               Venous Clinical Severity Score  Pain:2=Daily, moderate activity limitation, occasional analgesics  Varicose Veins: 2=Multiple. GS varicose veins confined to calf or thigh  Venous Edema: 1=Evening ankle edema only  Pigmentation: 0=None or focal, low intensity (tan)  Inflammation: 0=None  Induration: 0=None  Number of Active Ulcers: 0=0  Active Ulceration, Duration: 0=None  Active Ulcer Size: 0=None  Compressive Therapy: 3=Full compliance, stockings + elevation  Total Score: 8        III. ASSESSMENT & PLAN (MEDICAL DECISION MAKING)      1. Venous insufficiency    2. Varicose veins of bilateral lower extremities with pain    3. Leg pain, bilateral    4. Edema, lower extremity             Assessment/Diagnosis and Plan:  The patient continues to have sequela of chronic venous insufficiency despite over 3 months of conservative therapy. The patient continues to have life altering symptoms as well as a positive reflux study as noted in the history of present illness above. At this time I believe it would be reasonable to proceed with bilateral great saphenous vein , left small saphenous vein and bilateral pathological calf  veins, non thermal adhesive endovenous ablations  for symptomatic venous insufficiency.  Untreated venous disease increases the patient's risk for cellulitis, deep vein thrombosis, chronic skin changes and venous ulceration.  Risk and benefits of the procedure were explained to the patient and the patient wishes to proceed. The patient does not have overly distended veins and denies history of DVT/PE and will not require prophylactic anticoagulation.              Felix Padron, DO

## 2024-12-06 NOTE — ANESTHESIA POSTPROCEDURE EVALUATION
Anesthesia Post Evaluation    Patient: Margarita Turner    Procedure(s) Performed: Procedure(s) (LRB):  Left GSV w/ Left Pathological Calf  VenaSeal Ablation (Left)    Final Anesthesia Type: MAC      Patient location during evaluation: GI PACU  Patient participation: Yes- Able to Participate  Level of consciousness: awake and alert, oriented and awake  Post-procedure vital signs: reviewed and stable  Pain management: adequate  Airway patency: patent    PONV status at discharge: No PONV  Anesthetic complications: no      Cardiovascular status: blood pressure returned to baseline, hemodynamically stable and stable  Respiratory status: unassisted and spontaneous ventilation  Hydration status: euvolemic  Follow-up not needed.              Vitals Value Taken Time   /65 12/06/24 1059   Temp 36.1 °C (97 °F) 12/06/24 1022   Pulse 40 12/06/24 1059   Resp 20 12/06/24 1022   SpO2 97 % 12/06/24 1059   Vitals shown include unfiled device data.      Event Time   Out of Recovery 10:56:00         Pain/Lori Score: Lori Score: 10 (12/6/2024 10:48 AM)

## 2024-12-06 NOTE — OP NOTE
Date: 12/6/24   Surgeon: Dr Horacio Padorn DO    Procedure: Endovenous Adhesive Ablation of the left Greater Saphenous Vein and pathological calf  vein.    Estimated blood loss: 3 cc.  Specimens removed:  None.  Complications:  None.  Implants or grafts:  None.    Findings: Treatment Length  65 (cm):  Maximum diameter of the left great saphenous vein 6.7 mm with a 0.94 seconds reflux time.  Calf  measured 4.2 mm in diameter with 4.0 saphenous reflux time noted.      Pre-Procedure: Patient's vital signs and informed consent were obtained. Patient history was checked and updated with any changes since the last visit. The patient continues to present with symptoms of venous disease as proven via DUS Venous Insufficiency exam completed prior to procedure. A complete Time Out was performed; with all parties present, which verified patient's identification, intended procedure, correct procedure site, and all equipment/supplies needed to complete the procedure.     Procedure: Ultrasound guidance was used to dawson the target vein with the patient positioned on the treatment table. The entire lower extremity was prepped with a 50/50 % solution of isopropyl alcohol and chlorhexidine; then sterile drapes were applied. Once the desired access point was identified; less than 5mL of 0.5% Lidocaine buffered with Sodium Bicarbonate was distributed, to comfortably gain access in real-time with ultrasound guidance. A guidewire was used as necessary, which allowed insertion of the blue introduction catheter. Once positioned, the delivery catheter was prepped and inserted into the introducer sheath. The delivery catheter position was confirmed via ultrasound 5cm distal to Saphenofemoral junction. Following the IFU, adhesive was delivered, segmentally, into the target vein. Ultrasound visualization confirmed successful treatment of the targeted veins with no evidence of complications at the junction. All devices were  then removed, and hemostasis was achieved with a suture. Dressings with compression were applied to the access site and to any puncture sites requiring them. There was minimal blood loss.

## 2024-12-06 NOTE — ANESTHESIA PREPROCEDURE EVALUATION
12/06/2024  Margarita Turner is a 74 y.o., female.      Pre-op Assessment    I have reviewed the Patient Summary Reports.    I have reviewed the NPO Status.   I have reviewed the Medications.     Review of Systems  Hematology/Oncology:                      Current/Recent Cancer.  --  Cancer in past history:       Breast              Hepatic/GI:   PUD,                  Endocrine:  Diabetes, type 2 Hypothyroidism              Physical Exam  General: Well nourished, Cooperative, Alert and Oriented    Airway:  Mallampati: II   Mouth Opening: Normal        Anesthesia Plan  Type of Anesthesia, risks & benefits discussed:    Anesthesia Type: MAC, Gen Natural Airway  Intra-op Monitoring Plan: Standard ASA Monitors  Post Op Pain Control Plan: multimodal analgesia  Induction:  IV  Informed Consent: Informed consent signed with the Patient and all parties understand the risks and agree with anesthesia plan.  All questions answered. Patient consented to blood products? Yes  ASA Score: 3  Day of Surgery Review of History & Physical: I have interviewed and examined the patient. I have reviewed the patient's H&P dated: There are no significant changes.     Ready For Surgery From Anesthesia Perspective.     .  Past Medical History:   Diagnosis Date    Breast cancer     GERD (gastroesophageal reflux disease)     History of vertebral fracture     Hypothyroidism, unspecified     OA (osteoarthritis) of knee     Type 2 diabetes mellitus without complications       No current facility-administered medications on file prior to encounter.     Current Outpatient Medications on File Prior to Encounter   Medication Sig Dispense Refill    acyclovir (ZOVIRAX) 800 MG Tab Take 1 tablet (800 mg total) by mouth 2 (two) times daily. As needed for shingles 60 tablet 11    anastrozole (ARIMIDEX) 1 mg Tab Take 1 mg by mouth once daily.       aspirin (ECOTRIN) 81 MG EC tablet Take 81 mg by mouth.      HYDROcodone-acetaminophen (NORCO)  mg per tablet Take 1 tablet by mouth every 8 (eight) hours as needed. PRN      insulin degludec (TRESIBA FLEXTOUCH U-200) 200 unit/mL (3 mL) insulin pen Inject 24 Units into the skin once daily. 2 pen 7    insulin regular (NOVOLIN R REGULAR U100 INSULIN) 100 unit/mL Inj injection Inject 4 Units into the skin once daily. 3 mL 11    JANUVIA 100 mg Tab Take 1 tablet (100 mg total) by mouth once daily. 90 tablet 0    levothyroxine (SYNTHROID) 137 MCG Tab tablet Take 137 mcg by mouth once daily.      metFORMIN (GLUCOPHAGE-XR) 500 MG ER 24hr tablet Take 1 tablet (500 mg total) by mouth 3 (three) times daily. 90 tablet 5    omeprazole (PRILOSEC) 40 MG capsule Take 1 capsule by mouth once daily.

## 2024-12-06 NOTE — DISCHARGE INSTRUCTIONS
Vein Procedure Discharge Instructions    Today:  Someone must stay with you tonight.  No alcohol for at least 8 hours after your procedure.  No driving for 24 hours after your procedure if you receive sedation/anesthesia.    For the first 48 hours (2 days) following your procedure:  Walk at minimal 15 minutes on every hour (while awake).  The more active you are, the better.  When resting, elevate leg.  Keep leg propped up (recliner, couch, etc.) while sitting.  Take pain medication, if prescribed.  Take Ibuprofen, or Acetaminophen, for the next 2-3 days with an over-the-counter anti-acid (Prilosec, Protonix, Nexium, etc.)    After your 48 hours (2 days) Post Op period complete:  Remove dressings, throw away all except the ACE wrap, keep the ACE wrap.  Shower using warm soapy water.  Use separate wash cloth on the treated leg.  No baths for 2 weeks.  Compression up to groin must be worn daily for 2 weeks.  You may use the ACE wrap for entire leg, or compression hose to knees and ACE wrap to groin.  (If you had your small saphenous vein ablated, then compression only to knee)  If your vein was ablated with Varithena (foam), compression must be worn day and night.  Walk for at least 10 minutes daily for the next month.    Activity:  If you have a normal ultrasound one week after your ablation:  You may resume walking activities immediately.  You may resume jogging 2 weeks after your procedure.  You may resume swimming 2 weeks after your procedure.  If you had microphlebectomies, you must make sure all areas are completely healed before swimming.  You may resume lower body weight lifting 2 weeks after your procedure.  You may resume upper body weight lifting while sitting down 2 days after your procedure.  You may resume sexual activities 2 weeks after your procedure.  You may fly commercially 2 weeks after your procedure.  You may scuba dive 1 month after your procedure.    Pilots - You may not fly for 1 month after  your procedure.  You must have a normal 1 month post op ultrasound before returning to flight status.    Your follow-up appointment is: _________________________________________________________    Please call our office at [phone] with any questions or concerns.  You may call the following number for after hour and weekend concerns: [phone] (Dr. Padron).    Patient Signature ______________________________________Date/time_______________________

## 2024-12-06 NOTE — PLAN OF CARE
1022- Rec'd pt asleep but easily aroused. VSS. Dressing to L leg CDI, pulses palpable. All lines and tubes intact. Positioned for comfort and safety. Care ongoing.     1048- Pt awake and alert. Able to tolerate food and drink. VSS. Dressing to L leg CDI, pulses palpable. All lines and tubes intact. Positioned for comfort and safety. Care ongoing.     1110-Pt up and ambulating. All lines and tubes d/c'd. Dressing to L leg CDI, pulses palpable. VSS. Pending d/c.    1111- Pt d/c'd via wheelchair.

## 2024-12-11 ENCOUNTER — OFFICE VISIT (OUTPATIENT)
Dept: VASCULAR SURGERY | Facility: CLINIC | Age: 74
End: 2024-12-11
Payer: MEDICARE

## 2024-12-11 ENCOUNTER — HOSPITAL ENCOUNTER (OUTPATIENT)
Dept: RADIOLOGY | Facility: HOSPITAL | Age: 74
Discharge: HOME | End: 2024-12-11
Attending: FAMILY MEDICINE
Payer: MEDICARE

## 2024-12-11 VITALS
WEIGHT: 199.75 LBS | SYSTOLIC BLOOD PRESSURE: 113 MMHG | RESPIRATION RATE: 18 BRPM | BODY MASS INDEX: 31.35 KG/M2 | DIASTOLIC BLOOD PRESSURE: 71 MMHG | HEART RATE: 107 BPM | HEIGHT: 67 IN

## 2024-12-11 DIAGNOSIS — M79.605 LEG PAIN, BILATERAL: ICD-10-CM

## 2024-12-11 DIAGNOSIS — I87.2 VENOUS INSUFFICIENCY: Primary | ICD-10-CM

## 2024-12-11 DIAGNOSIS — I83.813 VARICOSE VEINS OF BILATERAL LOWER EXTREMITIES WITH PAIN: ICD-10-CM

## 2024-12-11 DIAGNOSIS — R60.0 EDEMA, LOWER EXTREMITY: ICD-10-CM

## 2024-12-11 DIAGNOSIS — I87.2 VENOUS INSUFFICIENCY: ICD-10-CM

## 2024-12-11 DIAGNOSIS — M79.604 LEG PAIN, BILATERAL: ICD-10-CM

## 2024-12-11 PROCEDURE — 99999 PR PBB SHADOW E&M-EST. PATIENT-LVL IV: CPT | Mod: PBBFAC,,, | Performed by: FAMILY MEDICINE

## 2024-12-11 PROCEDURE — 3078F DIAST BP <80 MM HG: CPT | Mod: CPTII,,, | Performed by: FAMILY MEDICINE

## 2024-12-11 PROCEDURE — 3074F SYST BP LT 130 MM HG: CPT | Mod: CPTII,,, | Performed by: FAMILY MEDICINE

## 2024-12-11 PROCEDURE — 93971 EXTREMITY STUDY: CPT | Mod: 26,LT,, | Performed by: FAMILY MEDICINE

## 2024-12-11 PROCEDURE — 93971 EXTREMITY STUDY: CPT | Mod: TC,LT

## 2024-12-11 PROCEDURE — 99214 OFFICE O/P EST MOD 30 MIN: CPT | Mod: PBBFAC,25 | Performed by: FAMILY MEDICINE

## 2024-12-11 PROCEDURE — 99214 OFFICE O/P EST MOD 30 MIN: CPT | Mod: S$PBB,,, | Performed by: FAMILY MEDICINE

## 2024-12-11 PROCEDURE — 1160F RVW MEDS BY RX/DR IN RCRD: CPT | Mod: CPTII,,, | Performed by: FAMILY MEDICINE

## 2024-12-11 PROCEDURE — 1159F MED LIST DOCD IN RCRD: CPT | Mod: CPTII,,, | Performed by: FAMILY MEDICINE

## 2024-12-11 NOTE — H&P (VIEW-ONLY)
VEIN CENTER CLINIC NOTE    Patient ID: Margarita Turner is a 74 y.o. female.    I. HISTORY     Chief Complaint:   Chief Complaint   Patient presents with    Follow-up     5D S/P LT GSV W/ LF PERF RF ABL         HPI: Margarita Turner is a 74 y.o. female who presents today for a 5 day follow-up after undergoing a left great saphenous vein non thermal adhesive ablation with ablation of the calf pathological .  The patient states she did pretty well over the weekend, some minor pain/discomfort at the insertion site and along the course of the vein.  It is non erythematous without increased warmth on presentation today.  Post ablation ultrasound shows a well ablated left great saphenous vein and left pathological calf .  No chemical induced thrombus noted.    Bilateral complete venous reflux study performed 10/22/2024 shows no evidence of DVT bilaterally.  The study also shows dilation and axial reflux of the bilateral great, left small saphenous vein and bilateral calf pathological perforators.    The patient was initially seen on 09/24/2024 by referral from Dr. Jean Carlos Berger for evaluation of lower extremity edema, varicose veins and pain.  Symptoms are worsening/persistent and began greater than 2 years ago.  Location is bilateral lower extremities below the knees. Symptoms are worse at the end of the day.  History of venous interventions includes none.  Denies family history of venous disease.  Denies history of DVT or cellulitis.  She states she has been wearing compression stockings for about 30-40 years.  She states that she continues to have persistent symptoms despite these measures and considers them life altering and would like to have the underlying condition corrected if possible.    The patient had a reflux study performed on 06/25/2024 at [facility] and [facility] which noted reflux in the bilateral great saphenous veins as well as bilateral pathological perforators.  No DVTs  bilaterally.  No deep venous reflux.  No reflux and small saphenous veins.    Venous Disease Medical Necessity Documentation Initial Visit Date:  09/24/2024 Return Check Date:    Have you ever had a rupture or bleed from a varicose vein in your leg(s)?              [] Yes  [x] No   [] Yes   [] No   Have you ever been diagnosed with phlebitis, cellulitis, or inflammation in the area of the varicose veins of  your leg(s)?  [] Yes  [x] No    [] Yes   [] No   Do you have darkened or inflamed skin on your legs?   [x] Yes   [] No   [] Yes   [] No   Do you have leg swelling?     [x] Yes   [] No   [] Yes   [] No   Do you have leg pain?   [x] Yes   [] No   [] Yes   [] No   If yes, describe the type of pain?    [x]   Stabbing [x]  Radiating [x]  Aching   [x]  Tightness [x]  Throbbing               [x]  Burning [x]  Cramping              Do you have leg discomfort?   [x] Yes   [] No   [] Yes   [] No   If yes, describe the type of discomfort?    [x]  Heaviness [x]  Fullness   [x]  Restlessness [x] Tired/Fatigued [x] Itching              Have you ever worn compression hose?   [x] Yes   [] No   [] Yes   [] No   If yes, how long? ON AND OFF 15+ YEARS       Do you elevate your legs while sitting?   [x] Yes   [] No   [] Yes   [] No   Does venous disease (varicose veins, ulcers, skin changes, leg pain/swelling) interfere with your daily life?  If yes, check activities you are limited or unable to do.    [x]  Shower  [x]   Walk  []  Exercise  [] Play with children/grandchildren  []  Shop [] Work [x] Stand for any period of time [x] Sleep                               [x] Sitting for an extended period of time.           [x] Yes   [] No   [] Yes   [] No   Do you exercise/have you tried to exercise (i.e.  Walk our participate in a regular exercise routine)?  [x] Yes  [] No   [] Yes   [] No   BMI   31.22           Past Medical History:   Diagnosis Date    Breast cancer     GERD (gastroesophageal reflux disease)     History of vertebral  fracture     Hypothyroidism, unspecified     OA (osteoarthritis) of knee     Type 2 diabetes mellitus without complications         Past Surgical History:   Procedure Laterality Date    ABLATION, CHEMICAL SEALANT, VARICOSE VEIN Left 12/6/2024    Procedure: Left GSV w/ Left Pathological Calf  VenaSeal Ablation;  Surgeon: Felix Padron DO;  Location: [facility];  Service: Peripheral Vascular;  Laterality: Left;    APPENDECTOMY      CATARACT EXTRACTION, BILATERAL      KNEE ARTHROSCOPY Right     MASTECTOMY, PARTIAL, WITH AXILLARY LYMPHADENECTOMY      OOPHORECTOMY Right     PARTIAL HYSTERECTOMY      REMOVAL OF BONE SPUR OF FOOT Right     TOTAL KNEE ARTHROPLASTY Left 11/2023    TUBAL LIGATION         Social History     Tobacco Use   Smoking Status Never    Passive exposure: Never   Smokeless Tobacco Never         Current Outpatient Medications:     acyclovir (ZOVIRAX) 800 MG Tab, Take 1 tablet (800 mg total) by mouth 2 (two) times daily. As needed for shingles, Disp: 60 tablet, Rfl: 11    anastrozole (ARIMIDEX) 1 mg Tab, Take 1 mg by mouth once daily., Disp: , Rfl:     aspirin (ECOTRIN) 81 MG EC tablet, Take 81 mg by mouth., Disp: , Rfl:     atorvastatin (LIPITOR) 20 MG tablet, Take 1 tablet (20 mg total) by mouth every evening., Disp: 90 tablet, Rfl: 0    blood-glucose sensor (FREESTYLE TEJA 3 SENSOR) Myriam, Use as directed to monitor blood sugar, Disp: 4 each, Rfl: 5    cyclobenzaprine (FLEXERIL) 10 MG tablet, Take 1 tablet (10 mg total) by mouth every evening., Disp: 90 tablet, Rfl: 0    flash glucose scanning reader (FREESTYLE TEJA 14 DAY READER) Misc, Use as directed to monitor blood sugar, Disp: 1 each, Rfl: 0    fluticasone propionate (FLONASE) 50 mcg/actuation nasal spray, 1 spray (50 mcg total) by Each Nostril route once daily., Disp: 11.1 mL, Rfl: 11    gabapentin (NEURONTIN) 100 MG capsule, Take 1 capsule (100 mg total) by mouth 3 (three) times daily., Disp: 270 capsule, Rfl: 0     HYDROcodone-acetaminophen (NORCO)  mg per tablet, Take 1 tablet by mouth every 8 (eight) hours as needed. PRN, Disp: , Rfl:     insulin degludec (TRESIBA FLEXTOUCH U-200) 200 unit/mL (3 mL) insulin pen, Inject 24 Units into the skin once daily., Disp: 2 pen , Rfl: 7    insulin regular (NOVOLIN R REGULAR U100 INSULIN) 100 unit/mL Inj injection, Inject 4 Units into the skin once daily., Disp: 3 mL, Rfl: 11    JANUVIA 100 mg Tab, Take 1 tablet (100 mg total) by mouth once daily., Disp: 90 tablet, Rfl: 0    levothyroxine (SYNTHROID) 137 MCG Tab tablet, Take 137 mcg by mouth once daily., Disp: , Rfl:     loratadine (CLARITIN) 10 mg tablet, Take 1 tablet (10 mg total) by mouth once daily., Disp: 90 tablet, Rfl: 3    metFORMIN (GLUCOPHAGE-XR) 500 MG ER 24hr tablet, Take 1 tablet (500 mg total) by mouth 3 (three) times daily., Disp: 90 tablet, Rfl: 5    omeprazole (PRILOSEC) 40 MG capsule, Take 1 capsule by mouth once daily., Disp: , Rfl:     Review of Systems   Constitutional:  Negative for activity change, chills, diaphoresis, fatigue and fever.   Respiratory:  Negative for cough and shortness of breath.    Cardiovascular:  Positive for leg swelling. Negative for chest pain and claudication.        Hyperpigmentation LE   Gastrointestinal:  Negative for nausea and vomiting.   Musculoskeletal:  Positive for leg pain. Negative for joint swelling.   Integumentary:  Negative for rash and wound.   Neurological:  Negative for weakness and numbness.          II. PHYSICAL EXAM     Physical Exam  Constitutional:       General: She is awake. She is not in acute distress.     Appearance: Normal appearance. She is obese. She is not ill-appearing or toxic-appearing.   HENT:      Head: Normocephalic and atraumatic.   Eyes:      Extraocular Movements: Extraocular movements intact.      Conjunctiva/sclera: Conjunctivae normal.      Pupils: Pupils are equal, round, and reactive to light.   Neck:      Vascular: No carotid bruit or  JVD.   Cardiovascular:      Rate and Rhythm: Normal rate and regular rhythm.      Pulses:           Dorsalis pedis pulses are detected w/ Doppler on the right side and detected w/ Doppler on the left side.        Posterior tibial pulses are detected w/ Doppler on the right side and detected w/ Doppler on the left side.      Heart sounds: No murmur heard.  Pulmonary:      Effort: Pulmonary effort is normal. No respiratory distress.      Breath sounds: No stridor. No wheezing, rhonchi or rales.   Musculoskeletal:         General: No swelling, tenderness or deformity.      Right lower le+ Edema present.      Left lower le+ Edema present.      Comments: No evidence of cellulitis, weeping or open ulceration bilaterally.   Feet:      Comments: Triphasic hand-held dopplerable pulses of the bilateral dorsalis pedis and posterior tibial arteries.  Skin:     General: Skin is warm.      Capillary Refill: Capillary refill takes less than 2 seconds.      Coloration: Skin is not ashen.      Findings: No bruising, erythema, lesion, rash or wound.   Neurological:      Mental Status: She is alert and oriented to person, place, and time.      Motor: No weakness.   Psychiatric:         Speech: Speech normal.         Behavior: Behavior normal. Behavior is cooperative.         Reticular/Spider veins noted:  RLE: anterior calf, medial calf, posterior calf, and lateral calf  LLE: anterior calf, medial calf, posterior calf, and lateral calf    Varicose veins noted:  RLE: anterior calf, medial calf, posterior calf, and lateral calf  LLE:  anterior calf, medial calf, posterior calf, and lateral calf    CEAP Classification  Clinical Signs: Class 3 - Edema  Etiologic Classification: Primary  Anatomic distribution: Superficial  Pathophysiologic dysfunction: Reflux                         Venous Clinical Severity Score  Pain:2=Daily, moderate activity limitation, occasional analgesics  Varicose Veins: 2=Multiple. GS varicose veins  confined to calf or thigh  Venous Edema: 1=Evening ankle edema only  Pigmentation: 0=None or focal, low intensity (tan)  Inflammation: 0=None  Induration: 0=None  Number of Active Ulcers: 0=0  Active Ulceration, Duration: 0=None  Active Ulcer Size: 0=None  Compressive Therapy: 3=Full compliance, stockings + elevation  Total Score: 8       III. ASSESSMENT & PLAN (MEDICAL DECISION MAKING)     1. Venous insufficiency    2. Varicose veins of bilateral lower extremities with pain    3. Leg pain, bilateral    4. Edema, lower extremity      Assessment/Diagnosis and Plan:  The patient continues to have sequela of chronic venous insufficiency despite over 3 months of conservative therapy. The patient continues to have life altering symptoms as well as a positive reflux study as noted in the history of present illness above. At this time I believe it would be reasonable to proceed with a right great saphenous vein, left small saphenous vein and a right pathological calf  vein, non thermal adhesive endovenous ablations for symptomatic venous insufficiency.  Untreated venous disease increases the patient's risk for cellulitis, deep vein thrombosis, chronic skin changes and venous ulceration.  Risk and benefits of the procedure were explained to the patient and the patient wishes to proceed. The patient does not have overly distended veins and denies history of DVT/PE and will not require prophylactic anticoagulation.          Felix Padron,

## 2024-12-11 NOTE — PROGRESS NOTES
VEIN CENTER CLINIC NOTE    Patient ID: Margarita Turner is a 74 y.o. female.    I. HISTORY     Chief Complaint:   Chief Complaint   Patient presents with    Follow-up     5D S/P LT GSV W/ LF PERF RF ABL         HPI: Margarita Turner is a 74 y.o. female who presents today for a 5 day follow-up after undergoing a left great saphenous vein non thermal adhesive ablation with ablation of the calf pathological .  The patient states she did pretty well over the weekend, some minor pain/discomfort at the insertion site and along the course of the vein.  It is non erythematous without increased warmth on presentation today.  Post ablation ultrasound shows a well ablated left great saphenous vein and left pathological calf .  No chemical induced thrombus noted.    Bilateral complete venous reflux study performed 10/22/2024 shows no evidence of DVT bilaterally.  The study also shows dilation and axial reflux of the bilateral great, left small saphenous vein and bilateral calf pathological perforators.    The patient was initially seen on 09/24/2024 by referral from Dr. Jean Carlos Berger for evaluation of lower extremity edema, varicose veins and pain.  Symptoms are worsening/persistent and began greater than 2 years ago.  Location is bilateral lower extremities below the knees. Symptoms are worse at the end of the day.  History of venous interventions includes none.  Denies family history of venous disease.  Denies history of DVT or cellulitis.  She states she has been wearing compression stockings for about 30-40 years.  She states that she continues to have persistent symptoms despite these measures and considers them life altering and would like to have the underlying condition corrected if possible.    The patient had a reflux study performed on 06/25/2024 at [facility] and [facility] which noted reflux in the bilateral great saphenous veins as well as bilateral pathological perforators.  No DVTs  bilaterally.  No deep venous reflux.  No reflux and small saphenous veins.    Venous Disease Medical Necessity Documentation Initial Visit Date:  09/24/2024 Return Check Date:    Have you ever had a rupture or bleed from a varicose vein in your leg(s)?              [] Yes  [x] No   [] Yes   [] No   Have you ever been diagnosed with phlebitis, cellulitis, or inflammation in the area of the varicose veins of  your leg(s)?  [] Yes  [x] No    [] Yes   [] No   Do you have darkened or inflamed skin on your legs?   [x] Yes   [] No   [] Yes   [] No   Do you have leg swelling?     [x] Yes   [] No   [] Yes   [] No   Do you have leg pain?   [x] Yes   [] No   [] Yes   [] No   If yes, describe the type of pain?    [x]   Stabbing [x]  Radiating [x]  Aching   [x]  Tightness [x]  Throbbing               [x]  Burning [x]  Cramping              Do you have leg discomfort?   [x] Yes   [] No   [] Yes   [] No   If yes, describe the type of discomfort?    [x]  Heaviness [x]  Fullness   [x]  Restlessness [x] Tired/Fatigued [x] Itching              Have you ever worn compression hose?   [x] Yes   [] No   [] Yes   [] No   If yes, how long? ON AND OFF 15+ YEARS       Do you elevate your legs while sitting?   [x] Yes   [] No   [] Yes   [] No   Does venous disease (varicose veins, ulcers, skin changes, leg pain/swelling) interfere with your daily life?  If yes, check activities you are limited or unable to do.    [x]  Shower  [x]   Walk  []  Exercise  [] Play with children/grandchildren  []  Shop [] Work [x] Stand for any period of time [x] Sleep                               [x] Sitting for an extended period of time.           [x] Yes   [] No   [] Yes   [] No   Do you exercise/have you tried to exercise (i.e.  Walk our participate in a regular exercise routine)?  [x] Yes  [] No   [] Yes   [] No   BMI   31.22           Past Medical History:   Diagnosis Date    Breast cancer     GERD (gastroesophageal reflux disease)     History of vertebral  fracture     Hypothyroidism, unspecified     OA (osteoarthritis) of knee     Type 2 diabetes mellitus without complications         Past Surgical History:   Procedure Laterality Date    ABLATION, CHEMICAL SEALANT, VARICOSE VEIN Left 12/6/2024    Procedure: Left GSV w/ Left Pathological Calf  VenaSeal Ablation;  Surgeon: Felix Padron DO;  Location: [facility];  Service: Peripheral Vascular;  Laterality: Left;    APPENDECTOMY      CATARACT EXTRACTION, BILATERAL      KNEE ARTHROSCOPY Right     MASTECTOMY, PARTIAL, WITH AXILLARY LYMPHADENECTOMY      OOPHORECTOMY Right     PARTIAL HYSTERECTOMY      REMOVAL OF BONE SPUR OF FOOT Right     TOTAL KNEE ARTHROPLASTY Left 11/2023    TUBAL LIGATION         Social History     Tobacco Use   Smoking Status Never    Passive exposure: Never   Smokeless Tobacco Never         Current Outpatient Medications:     acyclovir (ZOVIRAX) 800 MG Tab, Take 1 tablet (800 mg total) by mouth 2 (two) times daily. As needed for shingles, Disp: 60 tablet, Rfl: 11    anastrozole (ARIMIDEX) 1 mg Tab, Take 1 mg by mouth once daily., Disp: , Rfl:     aspirin (ECOTRIN) 81 MG EC tablet, Take 81 mg by mouth., Disp: , Rfl:     atorvastatin (LIPITOR) 20 MG tablet, Take 1 tablet (20 mg total) by mouth every evening., Disp: 90 tablet, Rfl: 0    blood-glucose sensor (FREESTYLE TEJA 3 SENSOR) Myriam, Use as directed to monitor blood sugar, Disp: 4 each, Rfl: 5    cyclobenzaprine (FLEXERIL) 10 MG tablet, Take 1 tablet (10 mg total) by mouth every evening., Disp: 90 tablet, Rfl: 0    flash glucose scanning reader (FREESTYLE TEJA 14 DAY READER) Misc, Use as directed to monitor blood sugar, Disp: 1 each, Rfl: 0    fluticasone propionate (FLONASE) 50 mcg/actuation nasal spray, 1 spray (50 mcg total) by Each Nostril route once daily., Disp: 11.1 mL, Rfl: 11    gabapentin (NEURONTIN) 100 MG capsule, Take 1 capsule (100 mg total) by mouth 3 (three) times daily., Disp: 270 capsule, Rfl: 0     HYDROcodone-acetaminophen (NORCO)  mg per tablet, Take 1 tablet by mouth every 8 (eight) hours as needed. PRN, Disp: , Rfl:     insulin degludec (TRESIBA FLEXTOUCH U-200) 200 unit/mL (3 mL) insulin pen, Inject 24 Units into the skin once daily., Disp: 2 pen , Rfl: 7    insulin regular (NOVOLIN R REGULAR U100 INSULIN) 100 unit/mL Inj injection, Inject 4 Units into the skin once daily., Disp: 3 mL, Rfl: 11    JANUVIA 100 mg Tab, Take 1 tablet (100 mg total) by mouth once daily., Disp: 90 tablet, Rfl: 0    levothyroxine (SYNTHROID) 137 MCG Tab tablet, Take 137 mcg by mouth once daily., Disp: , Rfl:     loratadine (CLARITIN) 10 mg tablet, Take 1 tablet (10 mg total) by mouth once daily., Disp: 90 tablet, Rfl: 3    metFORMIN (GLUCOPHAGE-XR) 500 MG ER 24hr tablet, Take 1 tablet (500 mg total) by mouth 3 (three) times daily., Disp: 90 tablet, Rfl: 5    omeprazole (PRILOSEC) 40 MG capsule, Take 1 capsule by mouth once daily., Disp: , Rfl:     Review of Systems   Constitutional:  Negative for activity change, chills, diaphoresis, fatigue and fever.   Respiratory:  Negative for cough and shortness of breath.    Cardiovascular:  Positive for leg swelling. Negative for chest pain and claudication.        Hyperpigmentation LE   Gastrointestinal:  Negative for nausea and vomiting.   Musculoskeletal:  Positive for leg pain. Negative for joint swelling.   Integumentary:  Negative for rash and wound.   Neurological:  Negative for weakness and numbness.          II. PHYSICAL EXAM     Physical Exam  Constitutional:       General: She is awake. She is not in acute distress.     Appearance: Normal appearance. She is obese. She is not ill-appearing or toxic-appearing.   HENT:      Head: Normocephalic and atraumatic.   Eyes:      Extraocular Movements: Extraocular movements intact.      Conjunctiva/sclera: Conjunctivae normal.      Pupils: Pupils are equal, round, and reactive to light.   Neck:      Vascular: No carotid bruit or  JVD.   Cardiovascular:      Rate and Rhythm: Normal rate and regular rhythm.      Pulses:           Dorsalis pedis pulses are detected w/ Doppler on the right side and detected w/ Doppler on the left side.        Posterior tibial pulses are detected w/ Doppler on the right side and detected w/ Doppler on the left side.      Heart sounds: No murmur heard.  Pulmonary:      Effort: Pulmonary effort is normal. No respiratory distress.      Breath sounds: No stridor. No wheezing, rhonchi or rales.   Musculoskeletal:         General: No swelling, tenderness or deformity.      Right lower le+ Edema present.      Left lower le+ Edema present.      Comments: No evidence of cellulitis, weeping or open ulceration bilaterally.   Feet:      Comments: Triphasic hand-held dopplerable pulses of the bilateral dorsalis pedis and posterior tibial arteries.  Skin:     General: Skin is warm.      Capillary Refill: Capillary refill takes less than 2 seconds.      Coloration: Skin is not ashen.      Findings: No bruising, erythema, lesion, rash or wound.   Neurological:      Mental Status: She is alert and oriented to person, place, and time.      Motor: No weakness.   Psychiatric:         Speech: Speech normal.         Behavior: Behavior normal. Behavior is cooperative.         Reticular/Spider veins noted:  RLE: anterior calf, medial calf, posterior calf, and lateral calf  LLE: anterior calf, medial calf, posterior calf, and lateral calf    Varicose veins noted:  RLE: anterior calf, medial calf, posterior calf, and lateral calf  LLE:  anterior calf, medial calf, posterior calf, and lateral calf    CEAP Classification  Clinical Signs: Class 3 - Edema  Etiologic Classification: Primary  Anatomic distribution: Superficial  Pathophysiologic dysfunction: Reflux                         Venous Clinical Severity Score  Pain:2=Daily, moderate activity limitation, occasional analgesics  Varicose Veins: 2=Multiple. GS varicose veins  confined to calf or thigh  Venous Edema: 1=Evening ankle edema only  Pigmentation: 0=None or focal, low intensity (tan)  Inflammation: 0=None  Induration: 0=None  Number of Active Ulcers: 0=0  Active Ulceration, Duration: 0=None  Active Ulcer Size: 0=None  Compressive Therapy: 3=Full compliance, stockings + elevation  Total Score: 8       III. ASSESSMENT & PLAN (MEDICAL DECISION MAKING)     1. Venous insufficiency    2. Varicose veins of bilateral lower extremities with pain    3. Leg pain, bilateral    4. Edema, lower extremity      Assessment/Diagnosis and Plan:  The patient continues to have sequela of chronic venous insufficiency despite over 3 months of conservative therapy. The patient continues to have life altering symptoms as well as a positive reflux study as noted in the history of present illness above. At this time I believe it would be reasonable to proceed with a right great saphenous vein, left small saphenous vein and a right pathological calf  vein, non thermal adhesive endovenous ablations for symptomatic venous insufficiency.  Untreated venous disease increases the patient's risk for cellulitis, deep vein thrombosis, chronic skin changes and venous ulceration.  Risk and benefits of the procedure were explained to the patient and the patient wishes to proceed. The patient does not have overly distended veins and denies history of DVT/PE and will not require prophylactic anticoagulation.          Felix Padron,

## 2024-12-12 RX ORDER — LIDOCAINE HYDROCHLORIDE 10 MG/ML
2 INJECTION, SOLUTION EPIDURAL; INFILTRATION; INTRACAUDAL; PERINEURAL ONCE
Status: DISCONTINUED | OUTPATIENT
Start: 2024-12-13 | End: 2024-12-13 | Stop reason: HOSPADM

## 2024-12-13 ENCOUNTER — HOSPITAL ENCOUNTER (OUTPATIENT)
Facility: HOSPITAL | Age: 74
Discharge: HOME | End: 2024-12-13
Attending: FAMILY MEDICINE | Admitting: FAMILY MEDICINE
Payer: MEDICARE

## 2024-12-13 ENCOUNTER — ANESTHESIA EVENT (OUTPATIENT)
Dept: PAIN MEDICINE | Facility: HOSPITAL | Age: 74
End: 2024-12-13
Payer: MEDICARE

## 2024-12-13 ENCOUNTER — ANESTHESIA (OUTPATIENT)
Dept: PAIN MEDICINE | Facility: HOSPITAL | Age: 74
End: 2024-12-13
Payer: MEDICARE

## 2024-12-13 VITALS
SYSTOLIC BLOOD PRESSURE: 125 MMHG | HEIGHT: 67 IN | BODY MASS INDEX: 31.08 KG/M2 | WEIGHT: 198 LBS | DIASTOLIC BLOOD PRESSURE: 65 MMHG | OXYGEN SATURATION: 100 % | RESPIRATION RATE: 10 BRPM | TEMPERATURE: 98 F | HEART RATE: 86 BPM

## 2024-12-13 VITALS — OXYGEN SATURATION: 98 % | SYSTOLIC BLOOD PRESSURE: 85 MMHG | DIASTOLIC BLOOD PRESSURE: 43 MMHG | HEART RATE: 104 BPM

## 2024-12-13 DIAGNOSIS — I87.2 VENOUS INSUFFICIENCY: Primary | ICD-10-CM

## 2024-12-13 LAB — GLUCOSE SERPL-MCNC: 129 MG/DL (ref 70–105)

## 2024-12-13 PROCEDURE — 82962 GLUCOSE BLOOD TEST: CPT

## 2024-12-13 PROCEDURE — 37000008 HC ANESTHESIA 1ST 15 MINUTES: Performed by: FAMILY MEDICINE

## 2024-12-13 PROCEDURE — 36482 ENDOVEN THER CHEM ADHES 1ST: CPT | Mod: LT,,, | Performed by: FAMILY MEDICINE

## 2024-12-13 PROCEDURE — 37000009 HC ANESTHESIA EA ADD 15 MINS: Performed by: FAMILY MEDICINE

## 2024-12-13 PROCEDURE — 27201423 OPTIME MED/SURG SUP & DEVICES STERILE SUPPLY: Performed by: FAMILY MEDICINE

## 2024-12-13 PROCEDURE — 63600175 PHARM REV CODE 636 W HCPCS: Performed by: FAMILY MEDICINE

## 2024-12-13 PROCEDURE — 27000284 HC CANNULA NASAL: Performed by: NURSE ANESTHETIST, CERTIFIED REGISTERED

## 2024-12-13 PROCEDURE — 27000716 HC OXISENSOR PROBE, ANY SIZE: Performed by: NURSE ANESTHETIST, CERTIFIED REGISTERED

## 2024-12-13 PROCEDURE — 63600175 PHARM REV CODE 636 W HCPCS: Performed by: NURSE ANESTHETIST, CERTIFIED REGISTERED

## 2024-12-13 PROCEDURE — 36482 ENDOVEN THER CHEM ADHES 1ST: CPT | Mod: 59 | Performed by: FAMILY MEDICINE

## 2024-12-13 PROCEDURE — 25000003 PHARM REV CODE 250: Performed by: NURSE ANESTHETIST, CERTIFIED REGISTERED

## 2024-12-13 PROCEDURE — 25000003 PHARM REV CODE 250: Performed by: FAMILY MEDICINE

## 2024-12-13 PROCEDURE — C1894 INTRO/SHEATH, NON-LASER: HCPCS | Performed by: FAMILY MEDICINE

## 2024-12-13 RX ORDER — LIDOCAINE HYDROCHLORIDE 20 MG/ML
INJECTION, SOLUTION EPIDURAL; INFILTRATION; INTRACAUDAL; PERINEURAL
Status: DISCONTINUED | OUTPATIENT
Start: 2024-12-13 | End: 2024-12-13

## 2024-12-13 RX ORDER — SODIUM CHLORIDE 9 MG/ML
INJECTION, SOLUTION INTRAVENOUS CONTINUOUS
Status: DISCONTINUED | OUTPATIENT
Start: 2024-12-13 | End: 2024-12-13 | Stop reason: HOSPADM

## 2024-12-13 RX ORDER — PROPOFOL 10 MG/ML
VIAL (ML) INTRAVENOUS CONTINUOUS PRN
Status: DISCONTINUED | OUTPATIENT
Start: 2024-12-13 | End: 2024-12-13

## 2024-12-13 RX ORDER — LIDOCAINE HYDROCHLORIDE 10 MG/ML
INJECTION, SOLUTION INFILTRATION; PERINEURAL CODE/TRAUMA/SEDATION MEDICATION
Status: DISCONTINUED | OUTPATIENT
Start: 2024-12-13 | End: 2024-12-13 | Stop reason: HOSPADM

## 2024-12-13 RX ORDER — MUPIROCIN 20 MG/G
OINTMENT TOPICAL CODE/TRAUMA/SEDATION MEDICATION
Status: DISCONTINUED | OUTPATIENT
Start: 2024-12-13 | End: 2024-12-13 | Stop reason: HOSPADM

## 2024-12-13 RX ADMIN — SODIUM CHLORIDE: 9 INJECTION, SOLUTION INTRAVENOUS at 08:12

## 2024-12-13 RX ADMIN — LIDOCAINE HYDROCHLORIDE 70 MG: 20 INJECTION, SOLUTION EPIDURAL; INFILTRATION; INTRACAUDAL; PERINEURAL at 08:12

## 2024-12-13 RX ADMIN — PROPOFOL 150 MCG/KG/MIN: 10 INJECTION, EMULSION INTRAVENOUS at 08:12

## 2024-12-13 NOTE — DISCHARGE INSTRUCTIONS
Vein Procedure Discharge Instructions    Today:  Someone must stay with you tonight.  No alcohol for at least 8 hours after your procedure.  No driving for 24 hours after your procedure if you receive sedation/anesthesia.    For the first 48 hours (2 days) following your procedure:  Walk at minimal 15 minutes on every hour (while awake).  The more active you are, the better.  When resting, elevate leg.  Keep leg propped up (recliner, couch, etc.) while sitting.  Take pain medication, if prescribed.  Take Ibuprofen, or Acetaminophen, for the next 2-3 days with an over-the-counter anti-acid (Prilosec, Protonix, Nexium, etc.)    After your 48 hours (2 days) Post Op period complete:  Remove dressings, throw away all except the ACE wrap, keep the ACE wrap.  Shower using warm soapy water.  Use separate wash cloth on the treated leg.  No baths for 2 weeks.  Compression up to groin must be worn daily for 2 weeks.  You may use the ACE wrap for entire leg, or compression hose to knees and ACE wrap to groin.  (If you had your small saphenous vein ablated, then compression only to knee)  If your vein was ablated with Varithena (foam), compression must be worn day and night.  Walk for at least 10 minutes daily for the next month.    Activity:  If you have a normal ultrasound one week after your ablation:  You may resume walking activities immediately.  You may resume jogging 2 weeks after your procedure.  You may resume swimming 2 weeks after your procedure.  If you had microphlebectomies, you must make sure all areas are completely healed before swimming.  You may resume lower body weight lifting 2 weeks after your procedure.  You may resume upper body weight lifting while sitting down 2 days after your procedure.  You may resume sexual activities 2 weeks after your procedure.  You may fly commercially 2 weeks after your procedure.  You may scuba dive 1 month after your procedure.    Pilots - You may not fly for 1 month after  your procedure.  You must have a normal 1 month post op ultrasound before returning to flight status.    Your follow-up appointment is: _________________________________________________________    Please call our office at [phone] with any questions or concerns.  You may call the following number for after hour and weekend concerns: [phone] (Dr. Padron).    Patient Signature ______________________________________Date/time_______________________

## 2024-12-13 NOTE — OP NOTE
Date: 12/13/24   Surgeon: Dr Horacio Padron DO    Procedure: Endovenous Adhesive Ablation of the left small Saphenous Vein     Estimated blood loss: 3 cc.  Specimens removed:  None.  Complications:  None.  Implants or grafts:  None.    Findings: Treatment Length  25 (cm):  The maximum diameter of the vein treated 3.0 mm with a maximum reflux time of 2.6 seconds.    Pre-Procedure: Patient's vital signs and informed consent were obtained. Patient history was checked and updated with any changes since the last visit. The patient continues to present with symptoms of venous disease as proven via DUS Venous Insufficiency exam completed prior to procedure. A complete Time Out was performed; with all parties present, which verified patient's identification, intended procedure, correct procedure site, and all equipment/supplies needed to complete the procedure.     Procedure: Ultrasound guidance was used to dawson the target vein with the patient positioned on the treatment table. The entire lower extremity was prepped with a 50/50 % solution of isopropyl alcohol and chlorhexidine; then sterile drapes were applied. Once the desired access point was identified; less than 5mL of 0.5% Lidocaine buffered with Sodium Bicarbonate was distributed, to comfortably gain access in real-time with ultrasound guidance. A guidewire was used as necessary, which allowed insertion of the blue introduction catheter. Once positioned, the delivery catheter was prepped and inserted into the introducer sheath. The delivery catheter position was confirmed via ultrasound 5cm distal to Saphenofemoral junction. Following the IFU, adhesive was delivered, segmentally, into the target vein. Ultrasound visualization confirmed successful treatment of the targeted vein with no evidence of complications at the junction. All devices were then removed, and hemostasis was achieved with a suture. Dressings with compression were applied to the access site and to  any puncture sites requiring them. There was minimal blood loss.

## 2024-12-13 NOTE — INTERVAL H&P NOTE
The patient has been examined and the H&P has been reviewed:    I concur with the findings and no changes have occurred since H&P was written.    Surgery risks, benefits and alternative options discussed and understood by patient/family for a left small saphenous vein non thermal adhesive ablation..          There are no hospital problems to display for this patient.

## 2024-12-13 NOTE — PLAN OF CARE
0932- Rec'd pt asleep but easily aroused. VSS. Dressing to L leg CDI, pulses palpable. All lines and tubes intact. Positioned for comfort and safety. Care ongoing.     0950- Pt awake and alert. Able to tolerate food and drink. VSS. Dressing to L leg CDI, pulses palpable. All lines and tubes intact. Positioned for comfort and safety. Care ongoing.     1014-Pt up and ambulating. All lines and tubes d/c'd. Dressing to L leg CDI, pulses palpable. VSS. Pending d/c.    1015- Pt d/c'd via wheelchair.

## 2024-12-13 NOTE — TRANSFER OF CARE
"Anesthesia Transfer of Care Note    Patient: Margarita Turner    Procedure(s) Performed: Procedure(s) (LRB):  Left SSV VenaSeal Ablation (Left)    Patient location: Other: (Pain Tx Center) [facility] Tx [facility]    Anesthesia Type: MAC    Transport from OR: Transported from OR on room air with adequate spontaneous ventilation    Post pain: adequate analgesia    Post assessment: no apparent anesthetic complications    Post vital signs: stable    Level of consciousness: sedated and responds to stimulation    Nausea/Vomiting: no nausea/vomiting    Complications: none    Transfer of care protocol was followed      Last vitals: Visit Vitals  BP (!) 94/50   Pulse 103   Temp 36.6 °C (97.8 °F) (Oral)   Resp 19   Ht 5' 7" (1.702 m)   Wt 89.8 kg (198 lb)   SpO2 98%   BMI 31.01 kg/m²     "

## 2024-12-13 NOTE — ANESTHESIA PREPROCEDURE EVALUATION
12/13/2024  Margarita Turner is a 74 y.o., female.    Past Medical History:   Diagnosis Date    Breast cancer     GERD (gastroesophageal reflux disease)     History of vertebral fracture     Hypothyroidism, unspecified     OA (osteoarthritis) of knee     Type 2 diabetes mellitus without complications        Past Surgical History:   Procedure Laterality Date    ABLATION, CHEMICAL SEALANT, VARICOSE VEIN Left 12/6/2024    Procedure: Left GSV w/ Left Pathological Calf  VenaSeal Ablation;  Surgeon: Felix Padron DO;  Location: [facility];  Service: Peripheral Vascular;  Laterality: Left;    APPENDECTOMY      CATARACT EXTRACTION, BILATERAL      KNEE ARTHROSCOPY Right     MASTECTOMY, PARTIAL, WITH AXILLARY LYMPHADENECTOMY      OOPHORECTOMY Right     PARTIAL HYSTERECTOMY      REMOVAL OF BONE SPUR OF FOOT Right     TOTAL KNEE ARTHROPLASTY Left 11/2023    TUBAL LIGATION         Family History   Problem Relation Name Age of Onset    Heart disease Mother      Arthritis Mother      Hypertension Mother      Diabetes Mother      Breast cancer Mother      Hyperlipidemia Mother      Alcohol abuse Father      Hyperlipidemia Father      Stroke Father      Hypertension Father      Hyperlipidemia Sister      Hypertension Sister      Hyperlipidemia Brother Navi Gaming     Hypertension Brother Navi Gaming     Diabetes Brother Navi Gaming     Colon cancer Brother Navi Amberly     Hyperlipidemia Brother      Hypertension Brother      Heart disease Brother      Diabetes Brother      Leukemia Brother         Social History     Socioeconomic History    Marital status:    Tobacco Use    Smoking status: Never     Passive exposure: Never    Smokeless tobacco: Never   Substance and Sexual Activity    Alcohol use: Never    Drug use: Never    Sexual activity: Not Currently     Social Drivers of Health     Financial  Resource Strain: Low Risk  (8/30/2024)    Overall Financial Resource Strain (CARDIA)     Difficulty of Paying Living Expenses: Not hard at all   Food Insecurity: No Food Insecurity (8/30/2024)    Hunger Vital Sign     Worried About Running Out of Food in the Last Year: Never true     Ran Out of Food in the Last Year: Never true   Transportation Needs: No Transportation Needs (8/30/2024)    PRAPARE - Transportation     Lack of Transportation (Medical): No     Lack of Transportation (Non-Medical): No   Physical Activity: Insufficiently Active (8/30/2024)    Exercise Vital Sign     Days of Exercise per Week: 2 days     Minutes of Exercise per Session: 20 min   Stress: No Stress Concern Present (8/30/2024)    [employer] - Occupational Stress Questionnaire     Feeling of Stress : Not at all   Housing Stability: Low Risk  (8/30/2024)    Housing Stability Vital Sign     Unable to Pay for Housing in the Last Year: No     Homeless in the Last Year: No       Current Facility-Administered Medications   Medication Dose Route Frequency Provider Last Rate Last Admin    0.9% NaCl infusion   Intravenous Continuous Felix Padron DO        LIDOcaine (PF) 10 mg/ml (1%) injection 20 mg  2 mL Other Once Felix Padron DO           Review of patient's allergies indicates:  No Known Allergies   Pre-op Assessment    I have reviewed the Patient Summary Reports.     I have reviewed the Nursing Notes. I have reviewed the NPO Status.   I have reviewed the Medications.     Review of Systems  Anesthesia Hx:  No problems with previous Anesthesia             Denies Family Hx of Anesthesia complications.    Denies Personal Hx of Anesthesia complications.                    Hematology/Oncology:    Oncology Normal                                   EENT/Dental:  EENT/Dental Normal           Cardiovascular:                hyperlipidemia                               Renal/:  Renal/ Normal                  Hepatic/GI:     GERD                Musculoskeletal:  Arthritis               Neurological:  Neurology Normal                                      Endocrine:  Diabetes, type 2 Hypothyroidism        Obesity / BMI > 30  Dermatological:  Skin Normal    Psych:  Psychiatric Normal                    Physical Exam  General: Well nourished, Alert, Oriented and Cooperative    Airway:  Mouth Opening: Normal  Neck ROM: Normal ROM    Chest/Lungs:  Normal Respiratory Rate    Heart:  Rate: Normal        Anesthesia Plan  Type of Anesthesia, risks & benefits discussed:    Anesthesia Type: Gen Natural Airway, MAC  Intra-op Monitoring Plan: Standard ASA Monitors  Post Op Pain Control Plan: multimodal analgesia and IV/PO Opioids PRN  Induction:  IV  Informed Consent: Informed consent signed with the Patient and all parties understand the risks and agree with anesthesia plan.  All questions answered. Patient consented to blood products? Yes  ASA Score: 3  Day of Surgery Review of History & Physical: I have interviewed and examined the patient. I have reviewed the patient's H&P dated: There are no significant changes.     Ready For Surgery From Anesthesia Perspective.     .

## 2024-12-13 NOTE — ANESTHESIA POSTPROCEDURE EVALUATION
Anesthesia Post Evaluation    Patient: Margarita Turner    Procedure(s) Performed: Procedure(s) (LRB):  Left SSV VenaSeal Ablation (Left)    Final Anesthesia Type: MAC      Patient participation: Yes- Able to Participate  Level of consciousness: awake and alert  Post-procedure vital signs: reviewed and stable  Pain management: adequate  Airway patency: patent    PONV status at discharge: No PONV  Anesthetic complications: no      Cardiovascular status: blood pressure returned to baseline, hemodynamically stable and stable  Respiratory status: unassisted  Hydration status: euvolemic  Follow-up not needed.  Comments: Refer to nursing notes for pain/abril score upon discharge from recovery              Vitals Value Taken Time   /65 12/13/24 1005   Temp 97F 12/13/24 1123   Pulse 86 12/13/24 1005   Resp 10 12/13/24 1005   SpO2 100 % 12/13/24 1005         Event Time   Out of Recovery 10:05:00         Pain/Abril Score: Abril Score: 10 (12/13/2024  9:50 AM)

## 2024-12-16 ENCOUNTER — OFFICE VISIT (OUTPATIENT)
Dept: VASCULAR SURGERY | Facility: CLINIC | Age: 74
End: 2024-12-16
Payer: MEDICARE

## 2024-12-16 ENCOUNTER — HOSPITAL ENCOUNTER (OUTPATIENT)
Dept: RADIOLOGY | Facility: HOSPITAL | Age: 74
Discharge: HOME | End: 2024-12-16
Attending: FAMILY MEDICINE
Payer: MEDICARE

## 2024-12-16 VITALS
SYSTOLIC BLOOD PRESSURE: 127 MMHG | BODY MASS INDEX: 32.99 KG/M2 | DIASTOLIC BLOOD PRESSURE: 75 MMHG | WEIGHT: 198 LBS | HEIGHT: 65 IN | HEART RATE: 76 BPM

## 2024-12-16 DIAGNOSIS — I83.813 VARICOSE VEINS OF BILATERAL LOWER EXTREMITIES WITH PAIN: ICD-10-CM

## 2024-12-16 DIAGNOSIS — R60.0 EDEMA, LOWER EXTREMITY: ICD-10-CM

## 2024-12-16 DIAGNOSIS — M79.604 LEG PAIN, BILATERAL: ICD-10-CM

## 2024-12-16 DIAGNOSIS — M79.605 LEG PAIN, BILATERAL: ICD-10-CM

## 2024-12-16 DIAGNOSIS — I87.2 VENOUS INSUFFICIENCY: Primary | ICD-10-CM

## 2024-12-16 DIAGNOSIS — I87.2 VENOUS INSUFFICIENCY: ICD-10-CM

## 2024-12-16 PROCEDURE — 1160F RVW MEDS BY RX/DR IN RCRD: CPT | Mod: CPTII,,, | Performed by: FAMILY MEDICINE

## 2024-12-16 PROCEDURE — 1159F MED LIST DOCD IN RCRD: CPT | Mod: CPTII,,, | Performed by: FAMILY MEDICINE

## 2024-12-16 PROCEDURE — 99214 OFFICE O/P EST MOD 30 MIN: CPT | Mod: PBBFAC,25 | Performed by: FAMILY MEDICINE

## 2024-12-16 PROCEDURE — 93971 EXTREMITY STUDY: CPT | Mod: 26,LT,, | Performed by: FAMILY MEDICINE

## 2024-12-16 PROCEDURE — 99999 PR PBB SHADOW E&M-EST. PATIENT-LVL IV: CPT | Mod: PBBFAC,,, | Performed by: FAMILY MEDICINE

## 2024-12-16 PROCEDURE — 3074F SYST BP LT 130 MM HG: CPT | Mod: CPTII,,, | Performed by: FAMILY MEDICINE

## 2024-12-16 PROCEDURE — 99214 OFFICE O/P EST MOD 30 MIN: CPT | Mod: S$PBB,,, | Performed by: FAMILY MEDICINE

## 2024-12-16 PROCEDURE — 93971 EXTREMITY STUDY: CPT | Mod: TC,LT

## 2024-12-16 PROCEDURE — 3078F DIAST BP <80 MM HG: CPT | Mod: CPTII,,, | Performed by: FAMILY MEDICINE

## 2024-12-16 NOTE — H&P (VIEW-ONLY)
VEIN CENTER CLINIC NOTE    Patient ID: Margarita Turner is a 74 y.o. female.    I. HISTORY     Chief Complaint:   Chief Complaint   Patient presents with    Follow-up      US 3D s/p LT SSV VenaSeal Ab        HPI: Margarita Turner is a 74 y.o. female who presents today for a 3 day follow-up after undergoing a left small saphenous vein venous seal ablation.  Patient states she has a little bit of soreness in her left calf over the weekend, but has resolved at this time.  Postoperative ultrasound today shows a well ablated left small saphenous vein without evidence of DVT.  She had a left great saphenous vein non thermal ablation the week prior.  Overall, feels her left lower extremity is less tight, less heavy and less painful.    Bilateral complete venous reflux study performed 10/22/2024 shows no evidence of DVT bilaterally.  The study also shows dilation and axial reflux of the bilateral great, left small saphenous vein and bilateral calf pathological perforators.    The patient was initially seen on 09/24/2024 by referral from Dr. Jean Carlos Berger for evaluation of lower extremity edema, varicose veins and pain.  Symptoms are worsening/persistent and began greater than 2 years ago.  Location is bilateral lower extremities below the knees. Symptoms are worse at the end of the day.  History of venous interventions includes none.  Denies family history of venous disease.  Denies history of DVT or cellulitis.  She states she has been wearing compression stockings for about 30-40 years.  She states that she continues to have persistent symptoms despite these measures and considers them life altering and would like to have the underlying condition corrected if possible.    The patient had a reflux study performed on 06/25/2024 at [facility] which noted reflux in the bilateral great saphenous veins as well as bilateral pathological perforators.  No DVTs bilaterally.  No deep venous reflux.  No  reflux and small saphenous veins.    Venous Disease Medical Necessity Documentation Initial Visit Date:  09/24/2024 Return Check Date:    Have you ever had a rupture or bleed from a varicose vein in your leg(s)?              [] Yes  [x] No   [] Yes   [] No   Have you ever been diagnosed with phlebitis, cellulitis, or inflammation in the area of the varicose veins of  your leg(s)?  [] Yes  [x] No    [] Yes   [] No   Do you have darkened or inflamed skin on your legs?   [x] Yes   [] No   [] Yes   [] No   Do you have leg swelling?     [x] Yes   [] No   [] Yes   [] No   Do you have leg pain?   [x] Yes   [] No   [] Yes   [] No   If yes, describe the type of pain?    [x]   Stabbing [x]  Radiating [x]  Aching   [x]  Tightness [x]  Throbbing               [x]  Burning [x]  Cramping              Do you have leg discomfort?   [x] Yes   [] No   [] Yes   [] No   If yes, describe the type of discomfort?    [x]  Heaviness [x]  Fullness   [x]  Restlessness [x] Tired/Fatigued [x] Itching              Have you ever worn compression hose?   [x] Yes   [] No   [] Yes   [] No   If yes, how long? ON AND OFF 15+ YEARS       Do you elevate your legs while sitting?   [x] Yes   [] No   [] Yes   [] No   Does venous disease (varicose veins, ulcers, skin changes, leg pain/swelling) interfere with your daily life?  If yes, check activities you are limited or unable to do.    [x]  Shower  [x]   Walk  []  Exercise  [] Play with children/grandchildren  []  Shop [] Work [x] Stand for any period of time [x] Sleep                               [x] Sitting for an extended period of time.           [x] Yes   [] No   [] Yes   [] No   Do you exercise/have you tried to exercise (i.e.  Walk our participate in a regular exercise routine)?  [x] Yes  [] No   [] Yes   [] No   BMI   31.22           Past Medical History:   Diagnosis Date    Breast cancer     GERD (gastroesophageal reflux disease)     History of vertebral fracture     Hypothyroidism, unspecified      OA (osteoarthritis) of knee     Type 2 diabetes mellitus without complications         Past Surgical History:   Procedure Laterality Date    ABLATION, CHEMICAL SEALANT, VARICOSE VEIN Left 12/6/2024    Procedure: Left GSV w/ Left Pathological Calf  VenaSeal Ablation;  Surgeon: Felix Padron DO;  Location: [facility];  Service: Peripheral Vascular;  Laterality: Left;    APPENDECTOMY      CATARACT EXTRACTION, BILATERAL      KNEE ARTHROSCOPY Right     MASTECTOMY, PARTIAL, WITH AXILLARY LYMPHADENECTOMY      OOPHORECTOMY Right     PARTIAL HYSTERECTOMY      REMOVAL OF BONE SPUR OF FOOT Right     TOTAL KNEE ARTHROPLASTY Left 11/2023    TUBAL LIGATION         Social History     Tobacco Use   Smoking Status Never    Passive exposure: Never   Smokeless Tobacco Never         Current Outpatient Medications:     acyclovir (ZOVIRAX) 800 MG Tab, Take 1 tablet (800 mg total) by mouth 2 (two) times daily. As needed for shingles, Disp: 60 tablet, Rfl: 11    anastrozole (ARIMIDEX) 1 mg Tab, Take 1 mg by mouth once daily., Disp: , Rfl:     aspirin (ECOTRIN) 81 MG EC tablet, Take 81 mg by mouth., Disp: , Rfl:     atorvastatin (LIPITOR) 20 MG tablet, Take 1 tablet (20 mg total) by mouth every evening., Disp: 90 tablet, Rfl: 0    blood-glucose sensor (FREESTYLE TEJA 3 SENSOR) Myriam, Use as directed to monitor blood sugar, Disp: 4 each, Rfl: 5    cyclobenzaprine (FLEXERIL) 10 MG tablet, Take 1 tablet (10 mg total) by mouth every evening., Disp: 90 tablet, Rfl: 0    flash glucose scanning reader (FREESTYLE TEJA 14 DAY READER) Misc, Use as directed to monitor blood sugar, Disp: 1 each, Rfl: 0    fluticasone propionate (FLONASE) 50 mcg/actuation nasal spray, 1 spray (50 mcg total) by Each Nostril route once daily., Disp: 11.1 mL, Rfl: 11    gabapentin (NEURONTIN) 100 MG capsule, Take 1 capsule (100 mg total) by mouth 3 (three) times daily., Disp: 270 capsule, Rfl: 0    HYDROcodone-acetaminophen (NORCO)  mg per  tablet, Take 1 tablet by mouth every 8 (eight) hours as needed. PRN, Disp: , Rfl:     insulin degludec (TRESIBA FLEXTOUCH U-200) 200 unit/mL (3 mL) insulin pen, Inject 24 Units into the skin once daily., Disp: 2 pen , Rfl: 7    insulin regular (NOVOLIN R REGULAR U100 INSULIN) 100 unit/mL Inj injection, Inject 4 Units into the skin once daily., Disp: 3 mL, Rfl: 11    JANUVIA 100 mg Tab, Take 1 tablet (100 mg total) by mouth once daily., Disp: 90 tablet, Rfl: 0    levothyroxine (SYNTHROID) 137 MCG Tab tablet, Take 137 mcg by mouth once daily., Disp: , Rfl:     loratadine (CLARITIN) 10 mg tablet, Take 1 tablet (10 mg total) by mouth once daily., Disp: 90 tablet, Rfl: 3    metFORMIN (GLUCOPHAGE-XR) 500 MG ER 24hr tablet, Take 1 tablet (500 mg total) by mouth 3 (three) times daily., Disp: 90 tablet, Rfl: 5    omeprazole (PRILOSEC) 40 MG capsule, Take 1 capsule by mouth once daily., Disp: , Rfl:     Review of Systems   Constitutional:  Negative for activity change, chills, diaphoresis, fatigue and fever.   Respiratory:  Negative for cough and shortness of breath.    Cardiovascular:  Positive for leg swelling. Negative for chest pain and claudication.        Hyperpigmentation LE   Gastrointestinal:  Negative for nausea and vomiting.   Musculoskeletal:  Positive for leg pain. Negative for joint swelling.   Integumentary:  Negative for rash and wound.   Neurological:  Negative for weakness and numbness.        II. PHYSICAL EXAM     Physical Exam  Constitutional:       General: She is awake. She is not in acute distress.     Appearance: Normal appearance. She is obese. She is not ill-appearing or toxic-appearing.   HENT:      Head: Normocephalic and atraumatic.   Eyes:      Extraocular Movements: Extraocular movements intact.      Conjunctiva/sclera: Conjunctivae normal.      Pupils: Pupils are equal, round, and reactive to light.   Neck:      Vascular: No carotid bruit or JVD.   Cardiovascular:      Rate and Rhythm: Normal  rate and regular rhythm.      Pulses:           Dorsalis pedis pulses are detected w/ Doppler on the right side and detected w/ Doppler on the left side.        Posterior tibial pulses are detected w/ Doppler on the right side and detected w/ Doppler on the left side.      Heart sounds: No murmur heard.  Pulmonary:      Effort: Pulmonary effort is normal. No respiratory distress.      Breath sounds: No stridor. No wheezing, rhonchi or rales.   Musculoskeletal:         General: No swelling, tenderness or deformity.      Right lower le+ Edema present.      Left lower le+ Edema present.      Comments: No evidence of cellulitis, weeping or open ulceration bilaterally.   Feet:      Comments: Triphasic hand-held dopplerable pulses of the bilateral dorsalis pedis and posterior tibial arteries.  Skin:     General: Skin is warm.      Capillary Refill: Capillary refill takes less than 2 seconds.      Coloration: Skin is not ashen.      Findings: No bruising, erythema, lesion, rash or wound.   Neurological:      Mental Status: She is alert and oriented to person, place, and time.      Motor: No weakness.   Psychiatric:         Speech: Speech normal.         Behavior: Behavior normal. Behavior is cooperative.         Reticular/Spider veins noted:  RLE: anterior calf, medial calf, posterior calf, and lateral calf  LLE: anterior calf, medial calf, posterior calf, and lateral calf    Varicose veins noted:  RLE: anterior calf, medial calf, posterior calf, and lateral calf  LLE:  anterior calf, medial calf, posterior calf, and lateral calf    CEAP Classification  Clinical Signs: Class 3 - Edema  Etiologic Classification: Primary  Anatomic distribution: Superficial  Pathophysiologic dysfunction: Reflux                         Venous Clinical Severity Score  Pain:2=Daily, moderate activity limitation, occasional analgesics  Varicose Veins: 2=Multiple. GS varicose veins confined to calf or thigh  Venous Edema: 1=Evening ankle  edema only  Pigmentation: 0=None or focal, low intensity (tan)  Inflammation: 0=None  Induration: 0=None  Number of Active Ulcers: 0=0  Active Ulceration, Duration: 0=None  Active Ulcer Size: 0=None  Compressive Therapy: 3=Full compliance, stockings + elevation  Total Score: 8       III. ASSESSMENT & PLAN (MEDICAL DECISION MAKING)     1. Venous insufficiency    2. Varicose veins of bilateral lower extremities with pain    3. Leg pain, bilateral    4. Edema, lower extremity        Assessment/Diagnosis and Plan:  The patient continues to have sequela of chronic venous insufficiency despite over 3 months of conservative therapy. The patient continues to have life altering symptoms as well as a positive reflux study as noted in the history of present illness above. At this time I believe it would be reasonable to proceed with a right great saphenous vein and a right pathological calf  vein, non thermal adhesive endovenous ablations for symptomatic venous insufficiency.  Untreated venous disease increases the patient's risk for cellulitis, deep vein thrombosis, chronic skin changes and venous ulceration.  Risk and benefits of the procedure were explained to the patient and the patient wishes to proceed. The patient does not have overly distended veins and denies history of DVT/PE and will not require prophylactic anticoagulation.          Felix Padron, DO

## 2024-12-16 NOTE — PROGRESS NOTES
VEIN CENTER CLINIC NOTE    Patient ID: Margarita Turner is a 74 y.o. female.    I. HISTORY     Chief Complaint:   Chief Complaint   Patient presents with    Follow-up      US 3D s/p LT SSV VenaSeal Ab        HPI: Margarita Turner is a 74 y.o. female who presents today for a 3 day follow-up after undergoing a left small saphenous vein venous seal ablation.  Patient states she has a little bit of soreness in her left calf over the weekend, but has resolved at this time.  Postoperative ultrasound today shows a well ablated left small saphenous vein without evidence of DVT.  She had a left great saphenous vein non thermal ablation the week prior.  Overall, feels her left lower extremity is less tight, less heavy and less painful.    Bilateral complete venous reflux study performed 10/22/2024 shows no evidence of DVT bilaterally.  The study also shows dilation and axial reflux of the bilateral great, left small saphenous vein and bilateral calf pathological perforators.    The patient was initially seen on 09/24/2024 by referral from Dr. Jean Carlos Berger for evaluation of lower extremity edema, varicose veins and pain.  Symptoms are worsening/persistent and began greater than 2 years ago.  Location is bilateral lower extremities below the knees. Symptoms are worse at the end of the day.  History of venous interventions includes none.  Denies family history of venous disease.  Denies history of DVT or cellulitis.  She states she has been wearing compression stockings for about 30-40 years.  She states that she continues to have persistent symptoms despite these measures and considers them life altering and would like to have the underlying condition corrected if possible.    The patient had a reflux study performed on 06/25/2024 at [facility] and [facility] which noted reflux in the bilateral great saphenous veins as well as bilateral pathological perforators.  No DVTs bilaterally.  No deep venous reflux.  No  reflux and small saphenous veins.    Venous Disease Medical Necessity Documentation Initial Visit Date:  09/24/2024 Return Check Date:    Have you ever had a rupture or bleed from a varicose vein in your leg(s)?              [] Yes  [x] No   [] Yes   [] No   Have you ever been diagnosed with phlebitis, cellulitis, or inflammation in the area of the varicose veins of  your leg(s)?  [] Yes  [x] No    [] Yes   [] No   Do you have darkened or inflamed skin on your legs?   [x] Yes   [] No   [] Yes   [] No   Do you have leg swelling?     [x] Yes   [] No   [] Yes   [] No   Do you have leg pain?   [x] Yes   [] No   [] Yes   [] No   If yes, describe the type of pain?    [x]   Stabbing [x]  Radiating [x]  Aching   [x]  Tightness [x]  Throbbing               [x]  Burning [x]  Cramping              Do you have leg discomfort?   [x] Yes   [] No   [] Yes   [] No   If yes, describe the type of discomfort?    [x]  Heaviness [x]  Fullness   [x]  Restlessness [x] Tired/Fatigued [x] Itching              Have you ever worn compression hose?   [x] Yes   [] No   [] Yes   [] No   If yes, how long? ON AND OFF 15+ YEARS       Do you elevate your legs while sitting?   [x] Yes   [] No   [] Yes   [] No   Does venous disease (varicose veins, ulcers, skin changes, leg pain/swelling) interfere with your daily life?  If yes, check activities you are limited or unable to do.    [x]  Shower  [x]   Walk  []  Exercise  [] Play with children/grandchildren  []  Shop [] Work [x] Stand for any period of time [x] Sleep                               [x] Sitting for an extended period of time.           [x] Yes   [] No   [] Yes   [] No   Do you exercise/have you tried to exercise (i.e.  Walk our participate in a regular exercise routine)?  [x] Yes  [] No   [] Yes   [] No   BMI   31.22           Past Medical History:   Diagnosis Date    Breast cancer     GERD (gastroesophageal reflux disease)     History of vertebral fracture     Hypothyroidism, unspecified      OA (osteoarthritis) of knee     Type 2 diabetes mellitus without complications         Past Surgical History:   Procedure Laterality Date    ABLATION, CHEMICAL SEALANT, VARICOSE VEIN Left 12/6/2024    Procedure: Left GSV w/ Left Pathological Calf  VenaSeal Ablation;  Surgeon: Felix Padron DO;  Location: [facility];  Service: Peripheral Vascular;  Laterality: Left;    APPENDECTOMY      CATARACT EXTRACTION, BILATERAL      KNEE ARTHROSCOPY Right     MASTECTOMY, PARTIAL, WITH AXILLARY LYMPHADENECTOMY      OOPHORECTOMY Right     PARTIAL HYSTERECTOMY      REMOVAL OF BONE SPUR OF FOOT Right     TOTAL KNEE ARTHROPLASTY Left 11/2023    TUBAL LIGATION         Social History     Tobacco Use   Smoking Status Never    Passive exposure: Never   Smokeless Tobacco Never         Current Outpatient Medications:     acyclovir (ZOVIRAX) 800 MG Tab, Take 1 tablet (800 mg total) by mouth 2 (two) times daily. As needed for shingles, Disp: 60 tablet, Rfl: 11    anastrozole (ARIMIDEX) 1 mg Tab, Take 1 mg by mouth once daily., Disp: , Rfl:     aspirin (ECOTRIN) 81 MG EC tablet, Take 81 mg by mouth., Disp: , Rfl:     atorvastatin (LIPITOR) 20 MG tablet, Take 1 tablet (20 mg total) by mouth every evening., Disp: 90 tablet, Rfl: 0    blood-glucose sensor (FREESTYLE TEJA 3 SENSOR) Myriam, Use as directed to monitor blood sugar, Disp: 4 each, Rfl: 5    cyclobenzaprine (FLEXERIL) 10 MG tablet, Take 1 tablet (10 mg total) by mouth every evening., Disp: 90 tablet, Rfl: 0    flash glucose scanning reader (FREESTYLE TEJA 14 DAY READER) Misc, Use as directed to monitor blood sugar, Disp: 1 each, Rfl: 0    fluticasone propionate (FLONASE) 50 mcg/actuation nasal spray, 1 spray (50 mcg total) by Each Nostril route once daily., Disp: 11.1 mL, Rfl: 11    gabapentin (NEURONTIN) 100 MG capsule, Take 1 capsule (100 mg total) by mouth 3 (three) times daily., Disp: 270 capsule, Rfl: 0    HYDROcodone-acetaminophen (NORCO)  mg per  tablet, Take 1 tablet by mouth every 8 (eight) hours as needed. PRN, Disp: , Rfl:     insulin degludec (TRESIBA FLEXTOUCH U-200) 200 unit/mL (3 mL) insulin pen, Inject 24 Units into the skin once daily., Disp: 2 pen , Rfl: 7    insulin regular (NOVOLIN R REGULAR U100 INSULIN) 100 unit/mL Inj injection, Inject 4 Units into the skin once daily., Disp: 3 mL, Rfl: 11    JANUVIA 100 mg Tab, Take 1 tablet (100 mg total) by mouth once daily., Disp: 90 tablet, Rfl: 0    levothyroxine (SYNTHROID) 137 MCG Tab tablet, Take 137 mcg by mouth once daily., Disp: , Rfl:     loratadine (CLARITIN) 10 mg tablet, Take 1 tablet (10 mg total) by mouth once daily., Disp: 90 tablet, Rfl: 3    metFORMIN (GLUCOPHAGE-XR) 500 MG ER 24hr tablet, Take 1 tablet (500 mg total) by mouth 3 (three) times daily., Disp: 90 tablet, Rfl: 5    omeprazole (PRILOSEC) 40 MG capsule, Take 1 capsule by mouth once daily., Disp: , Rfl:     Review of Systems   Constitutional:  Negative for activity change, chills, diaphoresis, fatigue and fever.   Respiratory:  Negative for cough and shortness of breath.    Cardiovascular:  Positive for leg swelling. Negative for chest pain and claudication.        Hyperpigmentation LE   Gastrointestinal:  Negative for nausea and vomiting.   Musculoskeletal:  Positive for leg pain. Negative for joint swelling.   Integumentary:  Negative for rash and wound.   Neurological:  Negative for weakness and numbness.        II. PHYSICAL EXAM     Physical Exam  Constitutional:       General: She is awake. She is not in acute distress.     Appearance: Normal appearance. She is obese. She is not ill-appearing or toxic-appearing.   HENT:      Head: Normocephalic and atraumatic.   Eyes:      Extraocular Movements: Extraocular movements intact.      Conjunctiva/sclera: Conjunctivae normal.      Pupils: Pupils are equal, round, and reactive to light.   Neck:      Vascular: No carotid bruit or JVD.   Cardiovascular:      Rate and Rhythm: Normal  rate and regular rhythm.      Pulses:           Dorsalis pedis pulses are detected w/ Doppler on the right side and detected w/ Doppler on the left side.        Posterior tibial pulses are detected w/ Doppler on the right side and detected w/ Doppler on the left side.      Heart sounds: No murmur heard.  Pulmonary:      Effort: Pulmonary effort is normal. No respiratory distress.      Breath sounds: No stridor. No wheezing, rhonchi or rales.   Musculoskeletal:         General: No swelling, tenderness or deformity.      Right lower le+ Edema present.      Left lower le+ Edema present.      Comments: No evidence of cellulitis, weeping or open ulceration bilaterally.   Feet:      Comments: Triphasic hand-held dopplerable pulses of the bilateral dorsalis pedis and posterior tibial arteries.  Skin:     General: Skin is warm.      Capillary Refill: Capillary refill takes less than 2 seconds.      Coloration: Skin is not ashen.      Findings: No bruising, erythema, lesion, rash or wound.   Neurological:      Mental Status: She is alert and oriented to person, place, and time.      Motor: No weakness.   Psychiatric:         Speech: Speech normal.         Behavior: Behavior normal. Behavior is cooperative.         Reticular/Spider veins noted:  RLE: anterior calf, medial calf, posterior calf, and lateral calf  LLE: anterior calf, medial calf, posterior calf, and lateral calf    Varicose veins noted:  RLE: anterior calf, medial calf, posterior calf, and lateral calf  LLE:  anterior calf, medial calf, posterior calf, and lateral calf    CEAP Classification  Clinical Signs: Class 3 - Edema  Etiologic Classification: Primary  Anatomic distribution: Superficial  Pathophysiologic dysfunction: Reflux                         Venous Clinical Severity Score  Pain:2=Daily, moderate activity limitation, occasional analgesics  Varicose Veins: 2=Multiple. GS varicose veins confined to calf or thigh  Venous Edema: 1=Evening ankle  edema only  Pigmentation: 0=None or focal, low intensity (tan)  Inflammation: 0=None  Induration: 0=None  Number of Active Ulcers: 0=0  Active Ulceration, Duration: 0=None  Active Ulcer Size: 0=None  Compressive Therapy: 3=Full compliance, stockings + elevation  Total Score: 8       III. ASSESSMENT & PLAN (MEDICAL DECISION MAKING)     1. Venous insufficiency    2. Varicose veins of bilateral lower extremities with pain    3. Leg pain, bilateral    4. Edema, lower extremity        Assessment/Diagnosis and Plan:  The patient continues to have sequela of chronic venous insufficiency despite over 3 months of conservative therapy. The patient continues to have life altering symptoms as well as a positive reflux study as noted in the history of present illness above. At this time I believe it would be reasonable to proceed with a right great saphenous vein and a right pathological calf  vein, non thermal adhesive endovenous ablations for symptomatic venous insufficiency.  Untreated venous disease increases the patient's risk for cellulitis, deep vein thrombosis, chronic skin changes and venous ulceration.  Risk and benefits of the procedure were explained to the patient and the patient wishes to proceed. The patient does not have overly distended veins and denies history of DVT/PE and will not require prophylactic anticoagulation.          Felix Padron, DO

## 2024-12-19 RX ORDER — LIDOCAINE HYDROCHLORIDE 10 MG/ML
2 INJECTION, SOLUTION INFILTRATION; PERINEURAL ONCE
Status: DISCONTINUED | OUTPATIENT
Start: 2024-12-19 | End: 2024-12-20 | Stop reason: HOSPADM

## 2024-12-20 ENCOUNTER — HOSPITAL ENCOUNTER (OUTPATIENT)
Facility: HOSPITAL | Age: 74
Discharge: HOME | End: 2024-12-20
Attending: FAMILY MEDICINE | Admitting: FAMILY MEDICINE
Payer: MEDICARE

## 2024-12-20 ENCOUNTER — ANESTHESIA (OUTPATIENT)
Dept: PAIN MEDICINE | Facility: HOSPITAL | Age: 74
End: 2024-12-20
Payer: MEDICARE

## 2024-12-20 ENCOUNTER — ANESTHESIA EVENT (OUTPATIENT)
Dept: PAIN MEDICINE | Facility: HOSPITAL | Age: 74
End: 2024-12-20
Payer: MEDICARE

## 2024-12-20 VITALS
RESPIRATION RATE: 18 BRPM | TEMPERATURE: 98 F | DIASTOLIC BLOOD PRESSURE: 55 MMHG | SYSTOLIC BLOOD PRESSURE: 121 MMHG | HEART RATE: 77 BPM | HEIGHT: 67 IN | BODY MASS INDEX: 31.08 KG/M2 | WEIGHT: 198 LBS | OXYGEN SATURATION: 99 %

## 2024-12-20 VITALS — DIASTOLIC BLOOD PRESSURE: 58 MMHG | SYSTOLIC BLOOD PRESSURE: 101 MMHG | HEART RATE: 96 BPM | OXYGEN SATURATION: 94 %

## 2024-12-20 DIAGNOSIS — I87.2 VENOUS INSUFFICIENCY: Primary | ICD-10-CM

## 2024-12-20 LAB — GLUCOSE SERPL-MCNC: 130 MG/DL (ref 70–105)

## 2024-12-20 PROCEDURE — 25000003 PHARM REV CODE 250: Performed by: FAMILY MEDICINE

## 2024-12-20 PROCEDURE — 25000003 PHARM REV CODE 250: Performed by: NURSE ANESTHETIST, CERTIFIED REGISTERED

## 2024-12-20 PROCEDURE — C1894 INTRO/SHEATH, NON-LASER: HCPCS | Performed by: FAMILY MEDICINE

## 2024-12-20 PROCEDURE — 27000716 HC OXISENSOR PROBE, ANY SIZE: Performed by: NURSE ANESTHETIST, CERTIFIED REGISTERED

## 2024-12-20 PROCEDURE — 36482 ENDOVEN THER CHEM ADHES 1ST: CPT | Performed by: FAMILY MEDICINE

## 2024-12-20 PROCEDURE — 63600175 PHARM REV CODE 636 W HCPCS: Performed by: NURSE ANESTHETIST, CERTIFIED REGISTERED

## 2024-12-20 PROCEDURE — 82962 GLUCOSE BLOOD TEST: CPT

## 2024-12-20 PROCEDURE — 27201423 OPTIME MED/SURG SUP & DEVICES STERILE SUPPLY: Performed by: FAMILY MEDICINE

## 2024-12-20 PROCEDURE — 37000008 HC ANESTHESIA 1ST 15 MINUTES: Performed by: FAMILY MEDICINE

## 2024-12-20 PROCEDURE — 36482 ENDOVEN THER CHEM ADHES 1ST: CPT | Mod: RT,,, | Performed by: FAMILY MEDICINE

## 2024-12-20 PROCEDURE — 27000284 HC CANNULA NASAL: Performed by: NURSE ANESTHETIST, CERTIFIED REGISTERED

## 2024-12-20 PROCEDURE — 37000009 HC ANESTHESIA EA ADD 15 MINS: Performed by: FAMILY MEDICINE

## 2024-12-20 PROCEDURE — 63600175 PHARM REV CODE 636 W HCPCS: Performed by: FAMILY MEDICINE

## 2024-12-20 RX ORDER — MUPIROCIN 20 MG/G
OINTMENT TOPICAL CODE/TRAUMA/SEDATION MEDICATION
Status: DISCONTINUED | OUTPATIENT
Start: 2024-12-20 | End: 2024-12-20 | Stop reason: HOSPADM

## 2024-12-20 RX ORDER — SODIUM CHLORIDE 9 MG/ML
INJECTION, SOLUTION INTRAVENOUS CONTINUOUS
Status: DISCONTINUED | OUTPATIENT
Start: 2024-12-20 | End: 2024-12-20 | Stop reason: HOSPADM

## 2024-12-20 RX ORDER — LIDOCAINE HYDROCHLORIDE 10 MG/ML
INJECTION, SOLUTION INFILTRATION; PERINEURAL CODE/TRAUMA/SEDATION MEDICATION
Status: DISCONTINUED | OUTPATIENT
Start: 2024-12-20 | End: 2024-12-20 | Stop reason: HOSPADM

## 2024-12-20 RX ORDER — PROPOFOL 10 MG/ML
VIAL (ML) INTRAVENOUS CONTINUOUS PRN
Status: DISCONTINUED | OUTPATIENT
Start: 2024-12-20 | End: 2024-12-20

## 2024-12-20 RX ORDER — LIDOCAINE HYDROCHLORIDE 20 MG/ML
INJECTION, SOLUTION EPIDURAL; INFILTRATION; INTRACAUDAL; PERINEURAL
Status: DISCONTINUED | OUTPATIENT
Start: 2024-12-20 | End: 2024-12-20

## 2024-12-20 RX ADMIN — SODIUM CHLORIDE: 9 INJECTION, SOLUTION INTRAVENOUS at 09:12

## 2024-12-20 RX ADMIN — LIDOCAINE HYDROCHLORIDE 60 MG: 20 INJECTION, SOLUTION EPIDURAL; INFILTRATION; INTRACAUDAL; PERINEURAL at 09:12

## 2024-12-20 RX ADMIN — PROPOFOL 125 MCG/KG/MIN: 10 INJECTION, EMULSION INTRAVENOUS at 09:12

## 2024-12-20 NOTE — DISCHARGE SUMMARY
[facility] - Pain Management  Discharge Note  Short Stay    Procedure(s) (LRB):  Right GSV w/ Right Pathological Calf  VenaSeal Ablation (Right)      OUTCOME: Patient tolerated treatment/procedure well without complication and is now ready for discharge.    DISPOSITION: Home or Self Care    FINAL DIAGNOSIS:  Venous insufficiency    FOLLOWUP: In clinic    DISCHARGE INSTRUCTIONS:  No discharge procedures on file.     TIME SPENT ON DISCHARGE: 5 minutes

## 2024-12-20 NOTE — ANESTHESIA PREPROCEDURE EVALUATION
12/20/2024  Margarita Turner is a 74 y.o., female.    Past Medical History:   Diagnosis Date    Breast cancer     GERD (gastroesophageal reflux disease)     History of vertebral fracture     Hypothyroidism, unspecified     OA (osteoarthritis) of knee     Type 2 diabetes mellitus without complications        Past Surgical History:   Procedure Laterality Date    ABLATION, CHEMICAL SEALANT, VARICOSE VEIN Left 12/6/2024    Procedure: Left GSV w/ Left Pathological Calf  VenaSeal Ablation;  Surgeon: Felix Padron DO;  Location: [facility];  Service: Peripheral Vascular;  Laterality: Left;    ABLATION, CHEMICAL SEALANT, VARICOSE VEIN Left 12/13/2024    Procedure: Left SSV VenaSeal Ablation;  Surgeon: Felix Padron DO;  Location: [facility];  Service: Peripheral Vascular;  Laterality: Left;    APPENDECTOMY      CATARACT EXTRACTION, BILATERAL      KNEE ARTHROSCOPY Right     MASTECTOMY, PARTIAL, WITH AXILLARY LYMPHADENECTOMY      OOPHORECTOMY Right     PARTIAL HYSTERECTOMY      REMOVAL OF BONE SPUR OF FOOT Right     TOTAL KNEE ARTHROPLASTY Left 11/2023    TUBAL LIGATION         Family History   Problem Relation Name Age of Onset    Heart disease Mother      Arthritis Mother      Hypertension Mother      Diabetes Mother      Breast cancer Mother      Hyperlipidemia Mother      Alcohol abuse Father      Hyperlipidemia Father      Stroke Father      Hypertension Father      Hyperlipidemia Sister      Hypertension Sister      Hyperlipidemia Brother Navi Gaming     Hypertension Brother Navi Amberly     Diabetes Brother Navi Amberly     Colon cancer Brother Navi Amberly     Hyperlipidemia Brother      Hypertension Brother      Heart disease Brother      Diabetes Brother      Leukemia Brother         Social History     Socioeconomic History    Marital status:    Tobacco Use    Smoking status:  Never     Passive exposure: Never    Smokeless tobacco: Never   Substance and Sexual Activity    Alcohol use: Never    Drug use: Never    Sexual activity: Not Currently     Social Drivers of Health     Financial Resource Strain: Low Risk  (8/30/2024)    Overall Financial Resource Strain (CARDIA)     Difficulty of Paying Living Expenses: Not hard at all   Food Insecurity: No Food Insecurity (8/30/2024)    Hunger Vital Sign     Worried About Running Out of Food in the Last Year: Never true     Ran Out of Food in the Last Year: Never true   Transportation Needs: No Transportation Needs (8/30/2024)    PRAPARE - Transportation     Lack of Transportation (Medical): No     Lack of Transportation (Non-Medical): No   Physical Activity: Insufficiently Active (8/30/2024)    Exercise Vital Sign     Days of Exercise per Week: 2 days     Minutes of Exercise per Session: 20 min   Stress: No Stress Concern Present (8/30/2024)    [employer] - Occupational Stress Questionnaire     Feeling of Stress : Not at all   Housing Stability: Low Risk  (8/30/2024)    Housing Stability Vital Sign     Unable to Pay for Housing in the Last Year: No     Homeless in the Last Year: No       Current Facility-Administered Medications   Medication Dose Route Frequency Provider Last Rate Last Admin    0.9% NaCl infusion   Intravenous Continuous Felix Padron DO        LIDOcaine HCL 10 mg/ml (1%) injection 2 mL  2 mL Other Once Felix Padron DO           Review of patient's allergies indicates:  No Known Allergies   Pre-op Assessment    I have reviewed the Patient Summary Reports.     I have reviewed the Nursing Notes. I have reviewed the NPO Status.   I have reviewed the Medications.     Review of Systems  Anesthesia Hx:  No problems with previous Anesthesia             Denies Family Hx of Anesthesia complications.    Denies Personal Hx of Anesthesia complications.                    Hematology/Oncology:    Oncology  Normal                                   EENT/Dental:  EENT/Dental Normal           Cardiovascular:               PVD hyperlipidemia                               Renal/:  Renal/ Normal                 Hepatic/GI:     GERD                Musculoskeletal:  Arthritis               Neurological:  Neurology Normal                                      Endocrine:  Diabetes Hypothyroidism        Obesity / BMI > 30  Dermatological:  Skin Normal    Psych:  Psychiatric Normal                    Physical Exam  General: Well nourished, Alert, Oriented and Cooperative    Airway:  Mouth Opening: Normal  Neck ROM: Normal ROM    Chest/Lungs:  Normal Respiratory Rate    Heart:  Rate: Normal        Anesthesia Plan  Type of Anesthesia, risks & benefits discussed:    Anesthesia Type: Gen Natural Airway, MAC  Intra-op Monitoring Plan: Standard ASA Monitors  Post Op Pain Control Plan: multimodal analgesia and IV/PO Opioids PRN  Induction:  IV  Informed Consent: Informed consent signed with the Patient and all parties understand the risks and agree with anesthesia plan.  All questions answered. Patient consented to blood products? Yes  ASA Score: 3  Day of Surgery Review of History & Physical: I have interviewed and examined the patient. I have reviewed the patient's H&P dated: There are no significant changes.     Ready For Surgery From Anesthesia Perspective.     .

## 2024-12-20 NOTE — TRANSFER OF CARE
"Anesthesia Transfer of Care Note    Patient: Margarita Turner    Procedure(s) Performed: Procedure(s) (LRB):  Right GSV w/ Right Pathological Calf  VenaSeal Ablation (Right)    Patient location: Other: (Pain Tx Center) Pain Tx Center    Anesthesia Type: MAC    Transport from OR: Transported from OR on room air with adequate spontaneous ventilation    Post pain: adequate analgesia    Post assessment: no apparent anesthetic complications    Post vital signs: stable    Level of consciousness: sedated and responds to stimulation    Nausea/Vomiting: no nausea/vomiting    Complications: none    Transfer of care protocol was followed      Last vitals: Visit Vitals  BP (!) 102/54   Pulse 93   Temp 36.6 °C (97.9 °F) (Oral)   Resp 18   Ht 5' 7" (1.702 m)   Wt 89.8 kg (198 lb)   SpO2 (!) 93%   BMI 31.01 kg/m²     "

## 2024-12-20 NOTE — OP NOTE
Date: 12/20/24   Surgeon: Dr Horacio Padron DO    Procedure: Endovenous Adhesive Ablation of the right Greater Saphenous Vein     Estimated blood loss: 3 cc.  Specimens removed:  None.  Complications:  None.  Implants or grafts:  None.    Findings: Treatment Length  65 (cm):  Maximum diameter of the vein treated 7.7 mm with a maximum reflux time of 3.8 seconds.      Pre-Procedure: Patient's vital signs and informed consent were obtained. Patient history was checked and updated with any changes since the last visit. The patient continues to present with symptoms of venous disease as proven via DUS Venous Insufficiency exam completed prior to procedure. A complete Time Out was performed; with all parties present, which verified patient's identification, intended procedure, correct procedure site, and all equipment/supplies needed to complete the procedure.     Procedure: Ultrasound guidance was used to dawson the target vein with the patient positioned on the treatment table. The entire lower extremity was prepped with a 50/50 % solution of isopropyl alcohol and chlorhexidine; then sterile drapes were applied. Once the desired access point was identified; less than 5mL of 0.5% Lidocaine buffered with Sodium Bicarbonate was distributed, to comfortably gain access in real-time with ultrasound guidance. A guidewire was used as necessary, which allowed insertion of the blue introduction catheter. Once positioned, the delivery catheter was prepped and inserted into the introducer sheath. The delivery catheter position was confirmed via ultrasound 5cm distal to Saphenofemoral junction. Following the IFU, adhesive was delivered, segmentally, into the target vein. Ultrasound visualization confirmed successful treatment of the targeted vein with no evidence of complications at the junction. All devices were then removed, and hemostasis was achieved with a suture. Dressings with compression were applied to the access site and to  any puncture sites requiring them. There was minimal blood loss.

## 2024-12-20 NOTE — INTERVAL H&P NOTE
The patient has been examined and the H&P has been reviewed:    I concur with the findings and no changes have occurred since H&P was written.    Surgery risks, benefits and alternative options discussed and understood by patient/family for a right great saphenous vein non thermal adhesive ablation with thermal ablation of a right calf pathological .          There are no hospital problems to display for this patient.

## 2024-12-20 NOTE — PLAN OF CARE
1100 Ambulated in the hallway with assistance.Tolerated it well.Discharge instructions given to her and reviewed..  1110 Left per W/C through the ambulatory entrance.Her ointment was given to her.

## 2024-12-20 NOTE — ANESTHESIA POSTPROCEDURE EVALUATION
Anesthesia Post Evaluation    Patient: Margarita Turner    Procedure(s) Performed: Procedure(s) (LRB):  Right GSV w/ Right Pathological Calf  VenaSeal Ablation (Right)    Final Anesthesia Type: MAC      Patient participation: Yes- Able to Participate  Level of consciousness: awake and alert  Post-procedure vital signs: reviewed and stable  Pain management: adequate  Airway patency: patent    PONV status at discharge: No PONV  Anesthetic complications: no      Cardiovascular status: blood pressure returned to baseline, hemodynamically stable and stable  Respiratory status: unassisted  Hydration status: euvolemic  Follow-up not needed.  Comments: Refer to nursing notes for pain/abril score upon discharge from recovery              Vitals Value Taken Time   /55 12/20/24 1100   Temp 97F 12/20/24 1404   Pulse 77 12/20/24 1100   Resp 0 12/20/24 1031   SpO2 99 % 12/20/24 1100         Event Time   Out of Recovery 11:00:00         Pain/Abril Score: Abril Score: 10 (12/20/2024 10:45 AM)

## 2024-12-23 ENCOUNTER — HOSPITAL ENCOUNTER (OUTPATIENT)
Dept: RADIOLOGY | Facility: HOSPITAL | Age: 74
Discharge: HOME | End: 2024-12-23
Attending: FAMILY MEDICINE
Payer: MEDICARE

## 2024-12-23 ENCOUNTER — OFFICE VISIT (OUTPATIENT)
Dept: VASCULAR SURGERY | Facility: CLINIC | Age: 74
End: 2024-12-23
Payer: MEDICARE

## 2024-12-23 VITALS
RESPIRATION RATE: 16 BRPM | SYSTOLIC BLOOD PRESSURE: 132 MMHG | HEART RATE: 86 BPM | HEIGHT: 67 IN | DIASTOLIC BLOOD PRESSURE: 68 MMHG | BODY MASS INDEX: 30.13 KG/M2 | WEIGHT: 192 LBS

## 2024-12-23 DIAGNOSIS — R60.0 EDEMA, LOWER EXTREMITY: ICD-10-CM

## 2024-12-23 DIAGNOSIS — I87.2 VENOUS INSUFFICIENCY: Primary | ICD-10-CM

## 2024-12-23 DIAGNOSIS — I83.813 VARICOSE VEINS OF BILATERAL LOWER EXTREMITIES WITH PAIN: ICD-10-CM

## 2024-12-23 DIAGNOSIS — I87.2 VENOUS INSUFFICIENCY: ICD-10-CM

## 2024-12-23 DIAGNOSIS — M79.605 LEG PAIN, BILATERAL: ICD-10-CM

## 2024-12-23 DIAGNOSIS — M79.604 LEG PAIN, BILATERAL: ICD-10-CM

## 2024-12-23 PROCEDURE — 99215 OFFICE O/P EST HI 40 MIN: CPT | Mod: PBBFAC,25 | Performed by: FAMILY MEDICINE

## 2024-12-23 PROCEDURE — 93971 EXTREMITY STUDY: CPT | Mod: 26,RT,, | Performed by: FAMILY MEDICINE

## 2024-12-23 PROCEDURE — 93971 EXTREMITY STUDY: CPT | Mod: TC,RT

## 2024-12-23 PROCEDURE — 1159F MED LIST DOCD IN RCRD: CPT | Mod: CPTII,,, | Performed by: FAMILY MEDICINE

## 2024-12-23 PROCEDURE — 3078F DIAST BP <80 MM HG: CPT | Mod: CPTII,,, | Performed by: FAMILY MEDICINE

## 2024-12-23 PROCEDURE — 99999 PR PBB SHADOW E&M-EST. PATIENT-LVL V: CPT | Mod: PBBFAC,,, | Performed by: FAMILY MEDICINE

## 2024-12-23 PROCEDURE — 99214 OFFICE O/P EST MOD 30 MIN: CPT | Mod: S$PBB,,, | Performed by: FAMILY MEDICINE

## 2024-12-23 PROCEDURE — 3075F SYST BP GE 130 - 139MM HG: CPT | Mod: CPTII,,, | Performed by: FAMILY MEDICINE

## 2024-12-23 PROCEDURE — 1160F RVW MEDS BY RX/DR IN RCRD: CPT | Mod: CPTII,,, | Performed by: FAMILY MEDICINE

## 2024-12-23 NOTE — PROGRESS NOTES
VEIN CENTER CLINIC NOTE    Patient ID: Margarita Turner is a 74 y.o. female.    I. HISTORY     Chief Complaint:   Chief Complaint   Patient presents with    Follow-up     US 3D S/P RT GSV W/ RT PATH CALF PERF VENASEAL ABL         HPI: Margarita Turner is a 74 y.o. female who presents today for a 3 day follow-up after undergoing  A right great saphenous vein non thermal adhesive ablation.  Patient states she did well over the weekend without complications.  She does admit to some soreness/pain at the insertion site at her right medial ankle.  Otherwise no signs of active phlebitis or inflammation.  States overall her legs feel better bilaterally.  She continues to wear her postoperative compression.  Postoperative ultrasound today shows a well ablated right great saphenous vein and pathological .  No evidence of chemical induced thrombus.       Clinical summary:  Bilateral complete venous reflux study performed 10/22/2024 shows no evidence of DVT bilaterally.  The study also shows dilation and axial reflux of the bilateral great, left small saphenous vein and bilateral calf pathological perforators.    The patient was initially seen on 09/24/2024 by referral from Dr. Jean Carlos Berger for evaluation of lower extremity edema, varicose veins and pain.  Symptoms are worsening/persistent and began greater than 2 years ago.  Location is bilateral lower extremities below the knees. Symptoms are worse at the end of the day.  History of venous interventions includes none.  Denies family history of venous disease.  Denies history of DVT or cellulitis.  She states she has been wearing compression stockings for about 30-40 years.  She states that she continues to have persistent symptoms despite these measures and considers them life altering and would like to have the underlying condition corrected if possible.    The patient had a reflux study performed on 06/25/2024 at [facility] and [facility] which noted  reflux in the bilateral great saphenous veins as well as bilateral pathological perforators.  No DVTs bilaterally.  No deep venous reflux.  No reflux and small saphenous veins.    Venous Disease Medical Necessity Documentation Initial Visit Date:  09/24/2024 Return Check Date:    Have you ever had a rupture or bleed from a varicose vein in your leg(s)?              [] Yes  [x] No   [] Yes   [] No   Have you ever been diagnosed with phlebitis, cellulitis, or inflammation in the area of the varicose veins of  your leg(s)?  [] Yes  [x] No    [] Yes   [] No   Do you have darkened or inflamed skin on your legs?   [x] Yes   [] No   [] Yes   [] No   Do you have leg swelling?     [x] Yes   [] No   [] Yes   [] No   Do you have leg pain?   [x] Yes   [] No   [] Yes   [] No   If yes, describe the type of pain?    [x]   Stabbing [x]  Radiating [x]  Aching   [x]  Tightness [x]  Throbbing               [x]  Burning [x]  Cramping              Do you have leg discomfort?   [x] Yes   [] No   [] Yes   [] No   If yes, describe the type of discomfort?    [x]  Heaviness [x]  Fullness   [x]  Restlessness [x] Tired/Fatigued [x] Itching              Have you ever worn compression hose?   [x] Yes   [] No   [] Yes   [] No   If yes, how long? ON AND OFF 15+ YEARS       Do you elevate your legs while sitting?   [x] Yes   [] No   [] Yes   [] No   Does venous disease (varicose veins, ulcers, skin changes, leg pain/swelling) interfere with your daily life?  If yes, check activities you are limited or unable to do.    [x]  Shower  [x]   Walk  []  Exercise  [] Play with children/grandchildren  []  Shop [] Work [x] Stand for any period of time [x] Sleep                               [x] Sitting for an extended period of time.           [x] Yes   [] No   [] Yes   [] No   Do you exercise/have you tried to exercise (i.e.  Walk our participate in a regular exercise routine)?  [x] Yes  [] No   [] Yes   [] No   BMI   31.22           Past Medical History:    Diagnosis Date    Breast cancer     GERD (gastroesophageal reflux disease)     History of vertebral fracture     Hypothyroidism, unspecified     OA (osteoarthritis) of knee     Type 2 diabetes mellitus without complications         Past Surgical History:   Procedure Laterality Date    ABLATION, CHEMICAL SEALANT, VARICOSE VEIN Left 12/6/2024    Procedure: Left GSV w/ Left Pathological Calf  VenaSeal Ablation;  Surgeon: Felix Padron DO;  Location: [facility];  Service: Peripheral Vascular;  Laterality: Left;    ABLATION, CHEMICAL SEALANT, VARICOSE VEIN Left 12/13/2024    Procedure: Left SSV VenaSeal Ablation;  Surgeon: Felix Padron DO;  Location: [facility];  Service: Peripheral Vascular;  Laterality: Left;    ABLATION, CHEMICAL SEALANT, VARICOSE VEIN Right 12/20/2024    Procedure: Right GSV w/ Right Pathological Calf  VenaSeal Ablation;  Surgeon: Felix Padron DO;  Location: [facility];  Service: Peripheral Vascular;  Laterality: Right;    APPENDECTOMY      CATARACT EXTRACTION, BILATERAL      KNEE ARTHROSCOPY Right     MASTECTOMY, PARTIAL, WITH AXILLARY LYMPHADENECTOMY      OOPHORECTOMY Right     PARTIAL HYSTERECTOMY      REMOVAL OF BONE SPUR OF FOOT Right     TOTAL KNEE ARTHROPLASTY Left 11/2023    TUBAL LIGATION         Social History     Tobacco Use   Smoking Status Never    Passive exposure: Never   Smokeless Tobacco Never         Current Outpatient Medications:     acyclovir (ZOVIRAX) 800 MG Tab, Take 1 tablet (800 mg total) by mouth 2 (two) times daily. As needed for shingles, Disp: 60 tablet, Rfl: 11    anastrozole (ARIMIDEX) 1 mg Tab, Take 1 mg by mouth once daily., Disp: , Rfl:     aspirin (ECOTRIN) 81 MG EC tablet, Take 81 mg by mouth., Disp: , Rfl:     atorvastatin (LIPITOR) 20 MG tablet, Take 1 tablet (20 mg total) by mouth every evening., Disp: 90 tablet, Rfl: 0    blood-glucose sensor (FREESTYLE TEJA 3 SENSOR) Myriam, Use as directed to  monitor blood sugar, Disp: 4 each, Rfl: 5    cyclobenzaprine (FLEXERIL) 10 MG tablet, Take 1 tablet (10 mg total) by mouth every evening., Disp: 90 tablet, Rfl: 0    flash glucose scanning reader (FREESTYLE TEJA 14 DAY READER) Misc, Use as directed to monitor blood sugar, Disp: 1 each, Rfl: 0    fluticasone propionate (FLONASE) 50 mcg/actuation nasal spray, 1 spray (50 mcg total) by Each Nostril route once daily., Disp: 11.1 mL, Rfl: 11    gabapentin (NEURONTIN) 100 MG capsule, Take 1 capsule (100 mg total) by mouth 3 (three) times daily., Disp: 270 capsule, Rfl: 0    HYDROcodone-acetaminophen (NORCO)  mg per tablet, Take 1 tablet by mouth every 8 (eight) hours as needed. PRN, Disp: , Rfl:     insulin degludec (TRESIBA FLEXTOUCH U-200) 200 unit/mL (3 mL) insulin pen, Inject 24 Units into the skin once daily., Disp: 2 pen , Rfl: 7    insulin regular (NOVOLIN R REGULAR U100 INSULIN) 100 unit/mL Inj injection, Inject 4 Units into the skin once daily., Disp: 3 mL, Rfl: 11    JANUVIA 100 mg Tab, Take 1 tablet (100 mg total) by mouth once daily., Disp: 90 tablet, Rfl: 0    levothyroxine (SYNTHROID) 137 MCG Tab tablet, Take 137 mcg by mouth once daily., Disp: , Rfl:     loratadine (CLARITIN) 10 mg tablet, Take 1 tablet (10 mg total) by mouth once daily., Disp: 90 tablet, Rfl: 3    metFORMIN (GLUCOPHAGE-XR) 500 MG ER 24hr tablet, Take 1 tablet (500 mg total) by mouth 3 (three) times daily., Disp: 90 tablet, Rfl: 5    omeprazole (PRILOSEC) 40 MG capsule, Take 1 capsule by mouth once daily., Disp: , Rfl:     Review of Systems   Constitutional:  Negative for activity change, chills, diaphoresis, fatigue and fever.   Respiratory:  Negative for cough and shortness of breath.    Cardiovascular:  Positive for leg swelling. Negative for chest pain and claudication.        Hyperpigmentation LE   Gastrointestinal:  Negative for nausea and vomiting.   Musculoskeletal:  Positive for leg pain. Negative for joint swelling.    Integumentary:  Negative for rash and wound.   Neurological:  Negative for weakness and numbness.        II. PHYSICAL EXAM     Physical Exam  Constitutional:       General: She is awake. She is not in acute distress.     Appearance: Normal appearance. She is obese. She is not ill-appearing or toxic-appearing.   HENT:      Head: Normocephalic and atraumatic.   Eyes:      Extraocular Movements: Extraocular movements intact.      Conjunctiva/sclera: Conjunctivae normal.      Pupils: Pupils are equal, round, and reactive to light.   Neck:      Vascular: No carotid bruit or JVD.   Cardiovascular:      Rate and Rhythm: Normal rate and regular rhythm.      Pulses:           Dorsalis pedis pulses are detected w/ Doppler on the right side and detected w/ Doppler on the left side.        Posterior tibial pulses are detected w/ Doppler on the right side and detected w/ Doppler on the left side.      Heart sounds: No murmur heard.  Pulmonary:      Effort: Pulmonary effort is normal. No respiratory distress.      Breath sounds: No stridor. No wheezing, rhonchi or rales.   Musculoskeletal:         General: No swelling, tenderness or deformity.      Right lower le+ Edema present.      Left lower le+ Edema present.      Comments: No evidence of cellulitis, weeping or open ulceration bilaterally.   Feet:      Comments: Triphasic hand-held dopplerable pulses of the bilateral dorsalis pedis and posterior tibial arteries.  Skin:     General: Skin is warm.      Capillary Refill: Capillary refill takes less than 2 seconds.      Coloration: Skin is not ashen.      Findings: No bruising, erythema, lesion, rash or wound.   Neurological:      Mental Status: She is alert and oriented to person, place, and time.      Motor: No weakness.   Psychiatric:         Speech: Speech normal.         Behavior: Behavior normal. Behavior is cooperative.         Reticular/Spider veins noted:  RLE: anterior calf, medial calf, posterior calf, and  lateral calf  LLE: anterior calf, medial calf, posterior calf, and lateral calf    Varicose veins noted:  RLE: anterior calf, medial calf, posterior calf, and lateral calf  LLE:  anterior calf, medial calf, posterior calf, and lateral calf    CEAP Classification  Clinical Signs: Class 3 - Edema  Etiologic Classification: Primary  Anatomic distribution: Superficial  Pathophysiologic dysfunction: Reflux                         Venous Clinical Severity Score  Pain:2=Daily, moderate activity limitation, occasional analgesics  Varicose Veins: 2=Multiple. GS varicose veins confined to calf or thigh  Venous Edema: 1=Evening ankle edema only  Pigmentation: 0=None or focal, low intensity (tan)  Inflammation: 0=None  Induration: 0=None  Number of Active Ulcers: 0=0  Active Ulceration, Duration: 0=None  Active Ulcer Size: 0=None  Compressive Therapy: 3=Full compliance, stockings + elevation  Total Score: 8       III. ASSESSMENT & PLAN (MEDICAL DECISION MAKING)     1. Venous insufficiency    2. Varicose veins of bilateral lower extremities with pain    3. Leg pain, bilateral    4. Edema, lower extremity        Assessment/Diagnosis and Plan:   The patient is doing well postprocedure and is encouraged to wear her thigh-high compression over the next 2 weeks.  She has been encouraged to at least wear knee-high compression along with regular leg exercise and leg elevation.  Follow-up in one-month.  No test unless she is having trouble.          Felix Padron, DO

## 2024-12-24 DIAGNOSIS — E11.9 TYPE 2 DIABETES MELLITUS WITHOUT COMPLICATION, WITH LONG-TERM CURRENT USE OF INSULIN: ICD-10-CM

## 2024-12-24 DIAGNOSIS — Z79.4 TYPE 2 DIABETES MELLITUS WITHOUT COMPLICATION, WITH LONG-TERM CURRENT USE OF INSULIN: ICD-10-CM

## 2024-12-24 RX ORDER — SITAGLIPTIN 100 MG/1
100 TABLET, FILM COATED ORAL DAILY
Qty: 90 TABLET | Refills: 0 | Status: SHIPPED | OUTPATIENT
Start: 2024-12-24

## 2024-12-31 RX ORDER — ERYTHROMYCIN 5 MG/G
OINTMENT OPHTHALMIC
Qty: 3.5 G | Refills: 0 | Status: SHIPPED | OUTPATIENT
Start: 2024-12-31

## 2024-12-31 RX ORDER — LEVOTHYROXINE SODIUM 137 UG/1
137 TABLET ORAL DAILY
Qty: 90 TABLET | Refills: 1 | Status: SHIPPED | OUTPATIENT
Start: 2024-12-31

## 2024-12-31 RX ORDER — CYCLOBENZAPRINE HCL 10 MG
10 TABLET ORAL NIGHTLY
Qty: 90 TABLET | Refills: 0 | Status: SHIPPED | OUTPATIENT
Start: 2024-12-31

## 2025-01-03 DIAGNOSIS — Z79.4 TYPE 2 DIABETES MELLITUS WITHOUT COMPLICATION, WITH LONG-TERM CURRENT USE OF INSULIN: Chronic | ICD-10-CM

## 2025-01-03 DIAGNOSIS — E11.9 TYPE 2 DIABETES MELLITUS WITHOUT COMPLICATION, WITH LONG-TERM CURRENT USE OF INSULIN: Chronic | ICD-10-CM

## 2025-01-03 DIAGNOSIS — J30.2 SEASONAL ALLERGIES: ICD-10-CM

## 2025-01-03 RX ORDER — BLOOD-GLUCOSE SENSOR
EACH MISCELLANEOUS
Qty: 4 EACH | Refills: 5 | Status: SHIPPED | OUTPATIENT
Start: 2025-01-03

## 2025-01-03 RX ORDER — LORATADINE 10 MG/1
10 TABLET ORAL DAILY
Qty: 90 TABLET | Refills: 2 | Status: SHIPPED | OUTPATIENT
Start: 2025-01-03

## 2025-01-03 RX ORDER — FLUTICASONE PROPIONATE 50 MCG
1 SPRAY, SUSPENSION (ML) NASAL DAILY
Qty: 11.1 ML | Refills: 9 | Status: SHIPPED | OUTPATIENT
Start: 2025-01-03 | End: 2026-01-03

## 2025-02-07 ENCOUNTER — TELEPHONE (OUTPATIENT)
Dept: FAMILY MEDICINE | Facility: CLINIC | Age: 75
End: 2025-02-07
Payer: MEDICARE

## 2025-02-07 NOTE — TELEPHONE ENCOUNTER
Pt waiting on pa for mookie sensor.  Until or if its approved, she is needing a glucose monitor, strips and lancets tests tid, sent to the pharmacy of [facility]

## 2025-02-11 ENCOUNTER — OFFICE VISIT (OUTPATIENT)
Dept: VASCULAR SURGERY | Facility: CLINIC | Age: 75
End: 2025-02-11
Payer: MEDICARE

## 2025-02-11 VITALS
RESPIRATION RATE: 16 BRPM | SYSTOLIC BLOOD PRESSURE: 144 MMHG | HEIGHT: 67 IN | WEIGHT: 202.63 LBS | HEART RATE: 96 BPM | DIASTOLIC BLOOD PRESSURE: 63 MMHG | BODY MASS INDEX: 31.8 KG/M2

## 2025-02-11 DIAGNOSIS — E11.42 TYPE 2 DIABETES MELLITUS WITH DIABETIC POLYNEUROPATHY, WITH LONG-TERM CURRENT USE OF INSULIN: Primary | ICD-10-CM

## 2025-02-11 DIAGNOSIS — Z79.4 TYPE 2 DIABETES MELLITUS WITH DIABETIC POLYNEUROPATHY, WITH LONG-TERM CURRENT USE OF INSULIN: Primary | ICD-10-CM

## 2025-02-11 DIAGNOSIS — R60.0 EDEMA, LOWER EXTREMITY: ICD-10-CM

## 2025-02-11 DIAGNOSIS — I87.2 VENOUS INSUFFICIENCY: Primary | ICD-10-CM

## 2025-02-11 PROCEDURE — 99213 OFFICE O/P EST LOW 20 MIN: CPT | Mod: S$PBB,,, | Performed by: FAMILY MEDICINE

## 2025-02-11 PROCEDURE — 99215 OFFICE O/P EST HI 40 MIN: CPT | Mod: PBBFAC | Performed by: FAMILY MEDICINE

## 2025-02-11 PROCEDURE — 1125F AMNT PAIN NOTED PAIN PRSNT: CPT | Mod: CPTII,,, | Performed by: FAMILY MEDICINE

## 2025-02-11 PROCEDURE — 1160F RVW MEDS BY RX/DR IN RCRD: CPT | Mod: CPTII,,, | Performed by: FAMILY MEDICINE

## 2025-02-11 PROCEDURE — 99999 PR PBB SHADOW E&M-EST. PATIENT-LVL V: CPT | Mod: PBBFAC,,, | Performed by: FAMILY MEDICINE

## 2025-02-11 PROCEDURE — 1159F MED LIST DOCD IN RCRD: CPT | Mod: CPTII,,, | Performed by: FAMILY MEDICINE

## 2025-02-11 PROCEDURE — 3078F DIAST BP <80 MM HG: CPT | Mod: CPTII,,, | Performed by: FAMILY MEDICINE

## 2025-02-11 PROCEDURE — 1101F PT FALLS ASSESS-DOCD LE1/YR: CPT | Mod: CPTII,,, | Performed by: FAMILY MEDICINE

## 2025-02-11 PROCEDURE — 3077F SYST BP >= 140 MM HG: CPT | Mod: CPTII,,, | Performed by: FAMILY MEDICINE

## 2025-02-11 PROCEDURE — 3288F FALL RISK ASSESSMENT DOCD: CPT | Mod: CPTII,,, | Performed by: FAMILY MEDICINE

## 2025-02-11 RX ORDER — LANCETS 33 GAUGE
1 EACH MISCELLANEOUS 3 TIMES DAILY
Qty: 200 EACH | Refills: 2 | Status: SHIPPED | OUTPATIENT
Start: 2025-02-11

## 2025-02-11 RX ORDER — INSULIN PUMP SYRINGE, 3 ML
EACH MISCELLANEOUS
Qty: 1 EACH | Refills: 0 | Status: SHIPPED | OUTPATIENT
Start: 2025-02-11 | End: 2026-02-11

## 2025-02-11 NOTE — PROGRESS NOTES
VEIN CENTER CLINIC NOTE    Patient ID: Margarita Turner is a 74 y.o. female.    I. HISTORY     Chief Complaint:   Chief Complaint   Patient presents with    Follow-up     EXAM ROOM 3.  1 MONTH S/P RT GSV w/RT PATH CALF PERF, LT SSV VENASEAL ABL AND LT GSV w/LT PATH PERF VENASEAL AB        HPI: Margarita Turner is a 74 y.o. female who presents today for a one-month follow-up after undergoing bilateral great saphenous vein non thermal ablation with pathological perforators as well as left small saphenous vein non thermal ablation.  Patient states she has done well since her procedures and noted less leg pain and edema.  Feels that she may have overdone it on her recent trip in his had some minor cramping at night over the past several nights.  Otherwise, she has been wearing her compression daily and tries to walk an adequate amount.  Overall, feels she is doing well.    Clinical summary:  Bilateral complete venous reflux study performed 10/22/2024 shows no evidence of DVT bilaterally.  The study also shows dilation and axial reflux of the bilateral great, left small saphenous vein and bilateral calf pathological perforators.    The patient was initially seen on 09/24/2024 by referral from Dr. Jean Carlos Berger for evaluation of lower extremity edema, varicose veins and pain.  Symptoms are worsening/persistent and began greater than 2 years ago.  Location is bilateral lower extremities below the knees. Symptoms are worse at the end of the day.  History of venous interventions includes none.  Denies family history of venous disease.  Denies history of DVT or cellulitis.  She states she has been wearing compression stockings for about 30-40 years.  She states that she continues to have persistent symptoms despite these measures and considers them life altering and would like to have the underlying condition corrected if possible.    The patient had a reflux study performed on 06/25/2024 at [facility] and [place]  [place] which noted reflux in the bilateral great saphenous veins as well as bilateral pathological perforators.  No DVTs bilaterally.  No deep venous reflux.  No reflux and small saphenous veins.    Venous Disease Medical Necessity Documentation Initial Visit Date:  09/24/2024 Return Check Date:    Have you ever had a rupture or bleed from a varicose vein in your leg(s)?              [] Yes  [x] No   [] Yes   [] No   Have you ever been diagnosed with phlebitis, cellulitis, or inflammation in the area of the varicose veins of  your leg(s)?  [] Yes  [x] No    [] Yes   [] No   Do you have darkened or inflamed skin on your legs?   [x] Yes   [] No   [] Yes   [] No   Do you have leg swelling?     [x] Yes   [] No   [] Yes   [] No   Do you have leg pain?   [x] Yes   [] No   [] Yes   [] No   If yes, describe the type of pain?    [x]   Stabbing [x]  Radiating [x]  Aching   [x]  Tightness [x]  Throbbing               [x]  Burning [x]  Cramping              Do you have leg discomfort?   [x] Yes   [] No   [] Yes   [] No   If yes, describe the type of discomfort?    [x]  Heaviness [x]  Fullness   [x]  Restlessness [x] Tired/Fatigued [x] Itching              Have you ever worn compression hose?   [x] Yes   [] No   [] Yes   [] No   If yes, how long? ON AND OFF 15+ YEARS       Do you elevate your legs while sitting?   [x] Yes   [] No   [] Yes   [] No   Does venous disease (varicose veins, ulcers, skin changes, leg pain/swelling) interfere with your daily life?  If yes, check activities you are limited or unable to do.    [x]  Shower  [x]   Walk  []  Exercise  [] Play with children/grandchildren  []  Shop [] Work [x] Stand for any period of time [x] Sleep                               [x] Sitting for an extended period of time.           [x] Yes   [] No   [] Yes   [] No   Do you exercise/have you tried to exercise (i.e.  Walk our participate in a regular exercise routine)?  [x] Yes  [] No   [] Yes   [] No   BMI   31.22            Past Medical History:   Diagnosis Date    Breast cancer     GERD (gastroesophageal reflux disease)     History of vertebral fracture     Hypothyroidism, unspecified     OA (osteoarthritis) of knee     Type 2 diabetes mellitus without complications         Past Surgical History:   Procedure Laterality Date    ABLATION, CHEMICAL SEALANT, VARICOSE VEIN Left 12/6/2024    Procedure: Left GSV w/ Left Pathological Calf  VenaSeal Ablation;  Surgeon: Felix Padron DO;  Location: [facility];  Service: Peripheral Vascular;  Laterality: Left;    ABLATION, CHEMICAL SEALANT, VARICOSE VEIN Left 12/13/2024    Procedure: Left SSV VenaSeal Ablation;  Surgeon: Felix Padron DO;  Location: [facility];  Service: Peripheral Vascular;  Laterality: Left;    ABLATION, CHEMICAL SEALANT, VARICOSE VEIN Right 12/20/2024    Procedure: Right GSV w/ Right Pathological Calf  VenaSeal Ablation;  Surgeon: Felix Padron DO;  Location: [facility];  Service: Peripheral Vascular;  Laterality: Right;    APPENDECTOMY      CATARACT EXTRACTION, BILATERAL      KNEE ARTHROSCOPY Right     MASTECTOMY, PARTIAL, WITH AXILLARY LYMPHADENECTOMY      OOPHORECTOMY Right     PARTIAL HYSTERECTOMY      REMOVAL OF BONE SPUR OF FOOT Right     TOTAL KNEE ARTHROPLASTY Left 11/2023    TUBAL LIGATION         Social History     Tobacco Use   Smoking Status Never    Passive exposure: Never   Smokeless Tobacco Never         Current Outpatient Medications:     acyclovir (ZOVIRAX) 800 MG Tab, Take 1 tablet (800 mg total) by mouth 2 (two) times daily. As needed for shingles, Disp: 60 tablet, Rfl: 11    anastrozole (ARIMIDEX) 1 mg Tab, Take 1 mg by mouth once daily., Disp: , Rfl:     aspirin (ECOTRIN) 81 MG EC tablet, Take 81 mg by mouth., Disp: , Rfl:     atorvastatin (LIPITOR) 20 MG tablet, Take 1 tablet (20 mg total) by mouth every evening., Disp: 90 tablet, Rfl: 0    blood-glucose sensor (FREESTYLE TEJA 3 SENSOR)  Myriam, Use as directed to monitor blood sugar, Disp: 4 each, Rfl: 5    cyclobenzaprine (FLEXERIL) 10 MG tablet, Take 1 tablet (10 mg total) by mouth every evening., Disp: 90 tablet, Rfl: 0    erythromycin (ROMYCIN) ophthalmic ointment, Apply thin ribbon to affected eye twice daily, Disp: 3.5 g, Rfl: 0    flash glucose scanning reader (FREESTYLE TEJA 14 DAY READER) Misc, Use as directed to monitor blood sugar, Disp: 1 each, Rfl: 0    fluticasone propionate (FLONASE) 50 mcg/actuation nasal spray, 1 spray (50 mcg total) by Each Nostril route once daily., Disp: 11.1 mL, Rfl: 9    gabapentin (NEURONTIN) 100 MG capsule, Take 1 capsule (100 mg total) by mouth 3 (three) times daily., Disp: 270 capsule, Rfl: 0    HYDROcodone-acetaminophen (NORCO)  mg per tablet, Take 1 tablet by mouth every 8 (eight) hours as needed. PRN, Disp: , Rfl:     insulin degludec (TRESIBA FLEXTOUCH U-200) 200 unit/mL (3 mL) insulin pen, Inject 24 Units into the skin once daily., Disp: 2 pen , Rfl: 7    insulin regular (NOVOLIN R REGULAR U100 INSULIN) 100 unit/mL Inj injection, Inject 4 Units into the skin once daily., Disp: 3 mL, Rfl: 11    JANUVIA 100 mg Tab, Take 1 tablet (100 mg total) by mouth once daily., Disp: 90 tablet, Rfl: 0    levothyroxine (SYNTHROID) 137 MCG Tab tablet, Take 1 tablet (137 mcg total) by mouth once daily., Disp: 90 tablet, Rfl: 1    loratadine (CLARITIN) 10 mg tablet, Take 1 tablet (10 mg total) by mouth once daily., Disp: 90 tablet, Rfl: 2    metFORMIN (GLUCOPHAGE-XR) 500 MG ER 24hr tablet, Take 1 tablet (500 mg total) by mouth 3 (three) times daily., Disp: 90 tablet, Rfl: 5    omeprazole (PRILOSEC) 40 MG capsule, Take 1 capsule by mouth once daily., Disp: , Rfl:     Review of Systems   Constitutional:  Negative for activity change, chills, diaphoresis, fatigue and fever.   Respiratory:  Negative for cough and shortness of breath.    Cardiovascular:  Positive for leg swelling. Negative for chest pain and  claudication.        Hyperpigmentation LE   Gastrointestinal:  Negative for nausea and vomiting.   Musculoskeletal:  Positive for leg pain. Negative for joint swelling.   Integumentary:  Negative for rash and wound.   Neurological:  Negative for weakness and numbness.        II. PHYSICAL EXAM     Physical Exam  Constitutional:       General: She is awake. She is not in acute distress.     Appearance: Normal appearance. She is obese. She is not ill-appearing or toxic-appearing.   HENT:      Head: Normocephalic and atraumatic.   Eyes:      Extraocular Movements: Extraocular movements intact.      Conjunctiva/sclera: Conjunctivae normal.      Pupils: Pupils are equal, round, and reactive to light.   Neck:      Vascular: No carotid bruit or JVD.   Cardiovascular:      Rate and Rhythm: Normal rate and regular rhythm.      Pulses:           Dorsalis pedis pulses are detected w/ Doppler on the right side and detected w/ Doppler on the left side.        Posterior tibial pulses are detected w/ Doppler on the right side and detected w/ Doppler on the left side.      Heart sounds: No murmur heard.  Pulmonary:      Effort: Pulmonary effort is normal. No respiratory distress.      Breath sounds: No stridor. No wheezing, rhonchi or rales.   Musculoskeletal:         General: No swelling, tenderness or deformity.      Right lower le+ Edema present.      Left lower le+ Edema present.      Comments: No evidence of cellulitis, weeping or open ulceration bilaterally.   Feet:      Comments: Triphasic hand-held dopplerable pulses of the bilateral dorsalis pedis and posterior tibial arteries.  Skin:     General: Skin is warm.      Capillary Refill: Capillary refill takes less than 2 seconds.      Coloration: Skin is not ashen.      Findings: No bruising, erythema, lesion, rash or wound.   Neurological:      Mental Status: She is alert and oriented to person, place, and time.      Motor: No weakness.   Psychiatric:         Speech:  Speech normal.         Behavior: Behavior normal. Behavior is cooperative.         Reticular/Spider veins noted:  RLE: anterior calf, medial calf, posterior calf, and lateral calf  LLE: anterior calf, medial calf, posterior calf, and lateral calf    Varicose veins noted:  RLE: anterior calf, medial calf, posterior calf, and lateral calf  LLE:  anterior calf, medial calf, posterior calf, and lateral calf    CEAP Classification  Clinical Signs: Class 3 - Edema  Etiologic Classification: Primary  Anatomic distribution: Superficial  Pathophysiologic dysfunction: Reflux       Venous Clinical Severity Score  Pain:2=Daily, moderate activity limitation, occasional analgesics  Varicose Veins: 2=Multiple. GS varicose veins confined to calf or thigh  Venous Edema: 1=Evening ankle edema only  Pigmentation: 0=None or focal, low intensity (tan)  Inflammation: 0=None  Induration: 0=None  Number of Active Ulcers: 0=0  Active Ulceration, Duration: 0=None  Active Ulcer Size: 0=None  Compressive Therapy: 3=Full compliance, stockings + elevation  Total Score: 8       III. ASSESSMENT & PLAN (MEDICAL DECISION MAKING)     1. Venous insufficiency    2. Edema, lower extremity        Assessment/Diagnosis and Plan:  The patient is doing well postprocedure and is encouraged to at least wear knee-high compression along with regular leg exercise and leg elevation.  Follow-up in in 2 months for a three-month post ablation.  No test unless she is having trouble.        Felix Padron, DO

## 2025-02-12 ENCOUNTER — OFFICE VISIT (OUTPATIENT)
Dept: FAMILY MEDICINE | Facility: CLINIC | Age: 75
End: 2025-02-12
Payer: MEDICARE

## 2025-02-12 VITALS
OXYGEN SATURATION: 96 % | BODY MASS INDEX: 31.76 KG/M2 | HEART RATE: 96 BPM | SYSTOLIC BLOOD PRESSURE: 127 MMHG | DIASTOLIC BLOOD PRESSURE: 83 MMHG | WEIGHT: 202.38 LBS | HEIGHT: 67 IN

## 2025-02-12 DIAGNOSIS — I83.813 VARICOSE VEINS OF BILATERAL LOWER EXTREMITIES WITH PAIN: Chronic | ICD-10-CM

## 2025-02-12 DIAGNOSIS — E03.9 HYPOTHYROIDISM, UNSPECIFIED TYPE: Primary | Chronic | ICD-10-CM

## 2025-02-12 DIAGNOSIS — Z13.220 SCREENING FOR LIPOID DISORDERS: ICD-10-CM

## 2025-02-12 DIAGNOSIS — E11.9 TYPE 2 DIABETES MELLITUS WITHOUT COMPLICATION, WITH LONG-TERM CURRENT USE OF INSULIN: Chronic | ICD-10-CM

## 2025-02-12 DIAGNOSIS — Z13.0 SCREENING, IRON DEFICIENCY ANEMIA: ICD-10-CM

## 2025-02-12 DIAGNOSIS — E11.42 TYPE 2 DIABETES MELLITUS WITH DIABETIC POLYNEUROPATHY, WITH LONG-TERM CURRENT USE OF INSULIN: Chronic | ICD-10-CM

## 2025-02-12 DIAGNOSIS — G62.9 NEUROPATHY: Chronic | ICD-10-CM

## 2025-02-12 DIAGNOSIS — C79.81: ICD-10-CM

## 2025-02-12 DIAGNOSIS — Z79.4 TYPE 2 DIABETES MELLITUS WITHOUT COMPLICATION, WITH LONG-TERM CURRENT USE OF INSULIN: Chronic | ICD-10-CM

## 2025-02-12 DIAGNOSIS — E78.2 MIXED HYPERLIPIDEMIA: Chronic | ICD-10-CM

## 2025-02-12 DIAGNOSIS — Z79.4 TYPE 2 DIABETES MELLITUS WITH DIABETIC POLYNEUROPATHY, WITH LONG-TERM CURRENT USE OF INSULIN: Chronic | ICD-10-CM

## 2025-02-12 PROCEDURE — 1159F MED LIST DOCD IN RCRD: CPT | Mod: ,,, | Performed by: FAMILY MEDICINE

## 2025-02-12 PROCEDURE — 80050 GENERAL HEALTH PANEL: CPT | Mod: ,,, | Performed by: CLINICAL MEDICAL LABORATORY

## 2025-02-12 PROCEDURE — 3074F SYST BP LT 130 MM HG: CPT | Mod: ,,, | Performed by: FAMILY MEDICINE

## 2025-02-12 PROCEDURE — 3079F DIAST BP 80-89 MM HG: CPT | Mod: ,,, | Performed by: FAMILY MEDICINE

## 2025-02-12 PROCEDURE — 82043 UR ALBUMIN QUANTITATIVE: CPT | Mod: ,,, | Performed by: CLINICAL MEDICAL LABORATORY

## 2025-02-12 PROCEDURE — 1125F AMNT PAIN NOTED PAIN PRSNT: CPT | Mod: ,,, | Performed by: FAMILY MEDICINE

## 2025-02-12 PROCEDURE — 1160F RVW MEDS BY RX/DR IN RCRD: CPT | Mod: ,,, | Performed by: FAMILY MEDICINE

## 2025-02-12 PROCEDURE — 3288F FALL RISK ASSESSMENT DOCD: CPT | Mod: ,,, | Performed by: FAMILY MEDICINE

## 2025-02-12 PROCEDURE — 99214 OFFICE O/P EST MOD 30 MIN: CPT | Mod: ,,, | Performed by: FAMILY MEDICINE

## 2025-02-12 PROCEDURE — 1101F PT FALLS ASSESS-DOCD LE1/YR: CPT | Mod: ,,, | Performed by: FAMILY MEDICINE

## 2025-02-12 PROCEDURE — 83036 HEMOGLOBIN GLYCOSYLATED A1C: CPT | Mod: ,,, | Performed by: CLINICAL MEDICAL LABORATORY

## 2025-02-12 PROCEDURE — 82570 ASSAY OF URINE CREATININE: CPT | Mod: ,,, | Performed by: CLINICAL MEDICAL LABORATORY

## 2025-02-12 RX ORDER — INSULIN DEGLUDEC 200 U/ML
24 INJECTION, SOLUTION SUBCUTANEOUS DAILY
Qty: 2 PEN | Refills: 7 | Status: SHIPPED | OUTPATIENT
Start: 2025-02-12

## 2025-02-12 RX ORDER — GABAPENTIN 100 MG/1
100 CAPSULE ORAL 3 TIMES DAILY
Qty: 270 CAPSULE | Refills: 1 | Status: SHIPPED | OUTPATIENT
Start: 2025-02-12

## 2025-02-12 RX ORDER — SITAGLIPTIN 100 MG/1
100 TABLET, FILM COATED ORAL DAILY
Qty: 90 TABLET | Refills: 1 | Status: SHIPPED | OUTPATIENT
Start: 2025-02-12 | End: 2025-02-12

## 2025-02-12 RX ORDER — METFORMIN HYDROCHLORIDE 500 MG/1
500 TABLET, EXTENDED RELEASE ORAL 3 TIMES DAILY
Qty: 90 TABLET | Refills: 5 | Status: SHIPPED | OUTPATIENT
Start: 2025-02-12

## 2025-02-12 RX ORDER — ATORVASTATIN CALCIUM 20 MG/1
20 TABLET, FILM COATED ORAL NIGHTLY
Qty: 90 TABLET | Refills: 3 | Status: SHIPPED | OUTPATIENT
Start: 2025-02-12

## 2025-02-12 NOTE — PROGRESS NOTES
Jean Carlos Berger MD   [facility]  [facility] [address] [address]  [phone]      PATIENT NAME: Margarita Turner  : 1950  DATE: 25  MRN: [id number]      Billing Provider: Jean Carlos Berger MD  Level of Service: [place] OFFICE/OUTPT VISIT, EST, LEVL IV, 30-39 MIN  Patient PCP Information       Provider PCP Type    Jean Carlos Berger MD General            Reason for Visit / Chief Complaint: Medication Refill and Other       Update PCP  Update Chief Complaint         History of Present Illness / Problem Focused Workflow     Margarita Turner presents to the clinic with Medication Refill and Other       Needs meds refilled and labs done        Review of Systems     Review of Systems   Constitutional:  Negative for activity change, chills and fever.   HENT:  Negative for sore throat.    Eyes:  Negative for pain.   Respiratory:  Negative for cough, chest tightness and shortness of breath.    Cardiovascular:  Negative for chest pain and palpitations.   Gastrointestinal:  Negative for abdominal pain.   Neurological:  Negative for dizziness, syncope and weakness.   Psychiatric/Behavioral:  Negative for confusion.         Medical / Social / Family History     Past Medical History:   Diagnosis Date    Breast cancer     GERD (gastroesophageal reflux disease)     History of vertebral fracture     Hypothyroidism, unspecified     OA (osteoarthritis) of knee     Type 2 diabetes mellitus without complications        Past Surgical History:   Procedure Laterality Date    ABLATION, CHEMICAL SEALANT, VARICOSE VEIN Left 2024    Procedure: Left GSV w/ Left Pathological Calf  VenaSeal Ablation;  Surgeon: Felix Padron DO;  Location: [facility];  Service: Peripheral Vascular;  Laterality: Left;    ABLATION, CHEMICAL SEALANT, VARICOSE VEIN Left 2024    Procedure: Left SSV VenaSeal Ablation;  Surgeon: Felix Padron  ;  Location: [facility];  Service: Peripheral Vascular;  Laterality: Left;    ABLATION, CHEMICAL SEALANT, VARICOSE VEIN Right 12/20/2024    Procedure: Right GSV w/ Right Pathological Calf  VenaSeal Ablation;  Surgeon: Felix Padron DO;  Location: [facility];  Service: Peripheral Vascular;  Laterality: Right;    APPENDECTOMY      CATARACT EXTRACTION, BILATERAL      KNEE ARTHROSCOPY Right     MASTECTOMY, PARTIAL, WITH AXILLARY LYMPHADENECTOMY      OOPHORECTOMY Right     PARTIAL HYSTERECTOMY      REMOVAL OF BONE SPUR OF FOOT Right     TOTAL KNEE ARTHROPLASTY Left 11/2023    TUBAL LIGATION         Social History    reports that she has never smoked. She has never been exposed to tobacco smoke. She has never used smokeless tobacco. She reports that she does not drink alcohol and does not use drugs.   Social History     Tobacco Use    Smoking status: Never     Passive exposure: Never    Smokeless tobacco: Never   Substance Use Topics    Alcohol use: Never    Drug use: Never       Family History  Family History   Problem Relation Name Age of Onset    Heart disease Mother      Arthritis Mother      Hypertension Mother      Diabetes Mother      Breast cancer Mother      Hyperlipidemia Mother      Alcohol abuse Father      Hyperlipidemia Father      Stroke Father      Hypertension Father      Hyperlipidemia Sister      Hypertension Sister      Hyperlipidemia Brother Navi Amberly     Hypertension Brother Navi Amberly     Diabetes Brother Navi Amberly     Colon cancer Brother Navi Amberly     Hyperlipidemia Brother      Hypertension Brother      Heart disease Brother      Diabetes Brother      Leukemia Brother         Medications and Allergies     Medications  Outpatient Medications Marked as Taking for the 2/12/25 encounter (Office Visit) with Jean Carlos Berger MD   Medication Sig Dispense Refill    acyclovir (ZOVIRAX) 800 MG Tab Take 1 tablet (800 mg total) by mouth 2 (two) times daily. As needed for  shingles 60 tablet 11    anastrozole (ARIMIDEX) 1 mg Tab Take 1 mg by mouth once daily.      aspirin (ECOTRIN) 81 MG EC tablet Take 81 mg by mouth.      blood sugar diagnostic (ONETOUCH ULTRA TEST) Strp 1 strip by Misc.(Non-Drug; Combo Route) route 3 (three) times daily. 200 strip 2    blood-glucose meter (ONETOUCH ULTRA2 METER) kit Use as instructed 1 each 0    blood-glucose sensor ([employer] TEJA 3 SENSOR) [facility] Use as directed to monitor blood sugar 4 each 5    cyclobenzaprine (FLEXERIL) 10 MG tablet Take 1 tablet (10 mg total) by mouth every evening. 90 tablet 0    erythromycin (ROMYCIN) ophthalmic ointment Apply thin ribbon to affected eye twice daily 3.5 g 0    flash glucose scanning reader ([employer]YLE TEJA 14 DAY READER) Misc Use as directed to monitor blood sugar 1 each 0    fluticasone propionate (FLONASE) 50 mcg/actuation nasal spray 1 spray (50 mcg total) by Each Nostril route once daily. 11.1 mL 9    HYDROcodone-acetaminophen (NORCO)  mg per tablet Take 1 tablet by mouth every 8 (eight) hours as needed. PRN      lancets (ONETOUCH DELICA LANCETS) 33 gauge Misc 1 lancet  by Misc.(Non-Drug; Combo Route) route 3 (three) times daily. 200 each 2    levothyroxine (SYNTHROID) 137 MCG Tab tablet Take 1 tablet (137 mcg total) by mouth once daily. 90 tablet 1    loratadine (CLARITIN) 10 mg tablet Take 1 tablet (10 mg total) by mouth once daily. 90 tablet 2    omeprazole (PRILOSEC) 40 MG capsule Take 1 capsule by mouth once daily.      [DISCONTINUED] atorvastatin (LIPITOR) 20 MG tablet Take 1 tablet (20 mg total) by mouth every evening. 90 tablet 0    [DISCONTINUED] gabapentin (NEURONTIN) 100 MG capsule Take 1 capsule (100 mg total) by mouth 3 (three) times daily. 270 capsule 0    [DISCONTINUED] insulin degludec (TRESIBA FLEXTOUCH U-200) 200 unit/mL (3 mL) insulin pen Inject 24 Units into the skin once daily. 2 pen 7    [DISCONTINUED] insulin regular (NOVOLIN R REGULAR U100 INSULIN) 100 unit/mL Inj injection  Inject 4 Units into the skin once daily. 3 mL 11    [DISCONTINUED] JANUVIA 100 mg Tab Take 1 tablet (100 mg total) by mouth once daily. 90 tablet 0    [DISCONTINUED] metFORMIN (GLUCOPHAGE-XR) 500 MG ER 24hr tablet Take 1 tablet (500 mg total) by mouth 3 (three) times daily. 90 tablet 5       Allergies  Review of patient's allergies indicates:  No Known Allergies    Physical Examination     Vitals:    02/12/25 1426   BP: 127/83   Pulse: 96     Physical Exam  Vitals reviewed.   Constitutional:       Appearance: Normal appearance.   HENT:      Head: Normocephalic and atraumatic.   Eyes:      Extraocular Movements: Extraocular movements intact.      Conjunctiva/sclera: Conjunctivae normal.      Pupils: Pupils are equal, round, and reactive to light.   Cardiovascular:      Rate and Rhythm: Normal rate and regular rhythm.      Heart sounds: Normal heart sounds.   Pulmonary:      Effort: Pulmonary effort is normal.      Breath sounds: Normal breath sounds.   Musculoskeletal:         General: Normal range of motion.      Cervical back: Normal range of motion.   Skin:     General: Skin is warm and dry.   Neurological:      General: No focal deficit present.      Mental Status: She is alert and oriented to person, place, and time.   Psychiatric:         Mood and Affect: Mood normal.         Behavior: Behavior normal.          Assessment and Plan (including Health Maintenance)      Problem List  Smart Sets  Document Outside HM   :    Plan:         Health Maintenance Due   Topic Date Due    Hepatitis C Screening  Never done    Diabetic Eye Exam  Never done    TETANUS VACCINE  Never done    Shingles Vaccine (1 of 2) Never done    Pneumococcal Vaccines (Age 50+) (2 of 2 - PCV) 02/05/2020    DEXA Scan  09/17/2021    RSV Vaccine (Age 60+ and Pregnant patients) (1 - 1-dose 75+ series) Never done    COVID-19 Vaccine (5 - 2024-25 season) 12/24/2024       Problem List Items Addressed This Visit          Cardiac/Vascular    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)       Oncology    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       Endocrine    Type 2 diabetes mellitus without complication, with long-term current use of insulin (Chronic)    Relevant Medications    atorvastatin (LIPITOR) 20 MG tablet    insulin degludec (TRESIBA FLEXTOUCH U-200) 200 unit/mL (3 mL) insulin pen    insulin regular (NOVOLIN R REGULAR U100 INSULIN) 100 unit/mL Inj injection    metFORMIN (GLUCOPHAGE-XR) 500 MG ER 24hr tablet    Hypothyroidism - Primary (Chronic)    Relevant Orders    CBC Auto Differential (Completed)    TSH (Completed)     Other Visit Diagnoses       Neuropathy  (Chronic)       Relevant Medications    gabapentin (NEURONTIN) 100 MG capsule    Type 2 diabetes mellitus with diabetic polyneuropathy, with long-term current use of insulin  (Chronic)       Relevant Medications    atorvastatin (LIPITOR) 20 MG tablet    insulin degludec (TRESIBA FLEXTOUCH U-200) 200 unit/mL (3 mL) insulin pen    insulin regular (NOVOLIN R REGULAR U100 INSULIN) 100 unit/mL Inj injection    metFORMIN (GLUCOPHAGE-XR) 500 MG ER 24hr tablet    Other Relevant Orders    Comprehensive Metabolic Panel (Completed)    Microalbumin/Creatinine Ratio, Urine (Completed)    Hemoglobin A1C (Completed)    Screening, iron deficiency anemia        Relevant Orders    CBC Auto Differential (Completed)    Screening for lipoid disorders        Relevant Orders    Lipid Panel (Completed)    Mixed hyperlipidemia  (Chronic)       Relevant Medications    atorvastatin (LIPITOR) 20 MG tablet    Other Relevant Orders    Lipid Panel (Completed)            Health Maintenance Topics with due status: Not Due       Topic Last Completion Date    Diabetes Urine Screening 02/12/2025    Lipid Panel 02/12/2025    Hemoglobin A1c 02/12/2025       Future Appointments   Date Time Provider Department Center   4/14/2025 10:15 AM Felix Padron DO [facility]   8/8/2025  2:30 PM Ab  Jean Carlos ANGEL MD [facility]   8/29/2025 10:00 AM AWV NURSE, [facility]            Signature:  [facility]  [facility] [address] [place] [address]  [phone]    Date of encounter: 2/12/25

## 2025-02-13 LAB
ALBUMIN SERPL BCP-MCNC: 4 G/DL (ref 3.4–4.8)
ALBUMIN/GLOB SERPL: 1.4 {RATIO}
ALP SERPL-CCNC: 109 U/L (ref 40–150)
ALT SERPL W P-5'-P-CCNC: 32 U/L
ANION GAP SERPL CALCULATED.3IONS-SCNC: 15 MMOL/L (ref 7–16)
AST SERPL W P-5'-P-CCNC: 42 U/L (ref 5–34)
BASOPHILS # BLD AUTO: 0.11 K/UL (ref 0–0.2)
BASOPHILS NFR BLD AUTO: 2.3 % (ref 0–1)
BILIRUB SERPL-MCNC: 0.5 MG/DL
BUN SERPL-MCNC: 13 MG/DL (ref 10–20)
BUN/CREAT SERPL: 18 (ref 6–20)
CALCIUM SERPL-MCNC: 10 MG/DL (ref 8.4–10.2)
CHLORIDE SERPL-SCNC: 105 MMOL/L (ref 98–107)
CHOLEST SERPL-MCNC: 149 MG/DL
CHOLEST/HDLC SERPL: 3.8 {RATIO}
CO2 SERPL-SCNC: 25 MMOL/L (ref 23–31)
CREAT SERPL-MCNC: 0.73 MG/DL (ref 0.55–1.02)
CREAT UR-MCNC: 86 MG/DL (ref 15–325)
DIFFERENTIAL METHOD BLD: ABNORMAL
EGFR (NO RACE VARIABLE) (RUSH/TITUS): 85 ML/MIN/1.73M2
EOSINOPHIL # BLD AUTO: 1.11 K/UL (ref 0–0.5)
EOSINOPHIL NFR BLD AUTO: 23.4 % (ref 1–4)
ERYTHROCYTE [DISTWIDTH] IN BLOOD BY AUTOMATED COUNT: 12 % (ref 11.5–14.5)
EST. AVERAGE GLUCOSE BLD GHB EST-MCNC: 146 MG/DL
GLOBULIN SER-MCNC: 2.8 G/DL (ref 2–4)
GLUCOSE SERPL-MCNC: 105 MG/DL (ref 82–115)
HBA1C MFR BLD HPLC: 6.7 %
HCT VFR BLD AUTO: 39 % (ref 38–47)
HDLC SERPL-MCNC: 39 MG/DL (ref 35–60)
HGB BLD-MCNC: 12.6 G/DL (ref 12–16)
IMM GRANULOCYTES # BLD AUTO: 0.05 K/UL (ref 0–0.04)
IMM GRANULOCYTES NFR BLD: 1.1 % (ref 0–0.4)
LYMPHOCYTES # BLD AUTO: 1.59 K/UL (ref 1–4.8)
LYMPHOCYTES NFR BLD AUTO: 33.5 % (ref 27–41)
MCH RBC QN AUTO: 30.4 PG (ref 27–31)
MCHC RBC AUTO-ENTMCNC: 32.3 G/DL (ref 32–36)
MCV RBC AUTO: 94 FL (ref 80–96)
MICROALBUMIN UR-MCNC: 0.7 MG/DL
MICROALBUMIN/CREAT RATIO PNL UR: 8.1 MG/G (ref 0–30)
MONOCYTES # BLD AUTO: 0.5 K/UL (ref 0–0.8)
MONOCYTES NFR BLD AUTO: 10.5 % (ref 2–6)
MPC BLD CALC-MCNC: 9.7 FL (ref 9.4–12.4)
NEUTROPHILS # BLD AUTO: 1.39 K/UL (ref 1.8–7.7)
NEUTROPHILS NFR BLD AUTO: 29.2 % (ref 53–65)
NONHDLC SERPL-MCNC: 110 MG/DL
NRBC # BLD AUTO: 0 X10E3/UL
NRBC, AUTO (.00): 0 %
PLATELET # BLD AUTO: 352 K/UL (ref 150–400)
POTASSIUM SERPL-SCNC: 5 MMOL/L (ref 3.5–5.1)
PROT SERPL-MCNC: 6.8 G/DL (ref 5.8–7.6)
RBC # BLD AUTO: 4.15 M/UL (ref 4.2–5.4)
SODIUM SERPL-SCNC: 140 MMOL/L (ref 136–145)
TRIGL SERPL-MCNC: 405 MG/DL (ref 37–140)
TSH SERPL DL<=0.005 MIU/L-ACNC: 6.73 UIU/ML (ref 0.35–4.94)
VLDLC SERPL-MCNC: ABNORMAL MG/DL
WBC # BLD AUTO: 4.75 K/UL (ref 4.5–11)

## 2025-02-27 DIAGNOSIS — E03.9 HYPOTHYROIDISM, UNSPECIFIED TYPE: Primary | ICD-10-CM

## 2025-02-27 RX ORDER — CYCLOBENZAPRINE HCL 10 MG
10 TABLET ORAL NIGHTLY
Qty: 90 TABLET | Refills: 0 | Status: SHIPPED | OUTPATIENT
Start: 2025-02-27

## 2025-02-27 RX ORDER — OMEPRAZOLE 40 MG/1
40 CAPSULE, DELAYED RELEASE ORAL DAILY
Qty: 90 CAPSULE | Refills: 1 | Status: SHIPPED | OUTPATIENT
Start: 2025-02-27

## 2025-02-27 RX ORDER — LEVOTHYROXINE SODIUM 150 UG/1
150 TABLET ORAL
Qty: 90 TABLET | Refills: 1 | Status: SHIPPED | OUTPATIENT
Start: 2025-02-27

## 2025-02-28 ENCOUNTER — OFFICE VISIT (OUTPATIENT)
Dept: FAMILY MEDICINE | Facility: CLINIC | Age: 75
End: 2025-02-28
Payer: MEDICARE

## 2025-02-28 VITALS
OXYGEN SATURATION: 97 % | WEIGHT: 204.69 LBS | BODY MASS INDEX: 32.13 KG/M2 | DIASTOLIC BLOOD PRESSURE: 62 MMHG | SYSTOLIC BLOOD PRESSURE: 117 MMHG | HEART RATE: 94 BPM | HEIGHT: 67 IN

## 2025-02-28 DIAGNOSIS — D72.10 EOSINOPHILIA, UNSPECIFIED TYPE: ICD-10-CM

## 2025-02-28 DIAGNOSIS — J30.2 SEASONAL ALLERGIES: Chronic | ICD-10-CM

## 2025-02-28 DIAGNOSIS — Z23 IMMUNIZATION DUE: ICD-10-CM

## 2025-02-28 DIAGNOSIS — E11.42 TYPE 2 DIABETES MELLITUS WITH DIABETIC POLYNEUROPATHY, WITH LONG-TERM CURRENT USE OF INSULIN: Chronic | ICD-10-CM

## 2025-02-28 DIAGNOSIS — Z85.3 HISTORY OF BREAST CANCER: Chronic | ICD-10-CM

## 2025-02-28 DIAGNOSIS — E78.2 MIXED HYPERLIPIDEMIA: Chronic | ICD-10-CM

## 2025-02-28 DIAGNOSIS — E03.9 HYPOTHYROIDISM, UNSPECIFIED TYPE: Primary | Chronic | ICD-10-CM

## 2025-02-28 DIAGNOSIS — M54.2 CERVICALGIA: Chronic | ICD-10-CM

## 2025-02-28 DIAGNOSIS — Z79.4 TYPE 2 DIABETES MELLITUS WITH DIABETIC POLYNEUROPATHY, WITH LONG-TERM CURRENT USE OF INSULIN: Chronic | ICD-10-CM

## 2025-02-28 RX ORDER — HYDROCODONE BITARTRATE AND ACETAMINOPHEN 10; 325 MG/1; MG/1
1 TABLET ORAL EVERY 8 HOURS PRN
Qty: 20 TABLET | Refills: 0 | Status: SHIPPED | OUTPATIENT
Start: 2025-02-28

## 2025-02-28 NOTE — Clinical Note
Hepatitis C Screening Never done declined Diabetic Eye Exam Never done last done in august24 in [place]- pt can't remember name of clinic-will find out for us TETANUS VACCINE Never donecan get at pharmacy Shingles Vaccine(1 of 2) Never donecan get at pharmacy Pneumococcal Vaccines (Age 50+)(2 of 2 - PCV) due on 02/05/2020 DEXA Scan due on 09/17/2021 RSV Vaccine (Age 60+ and Pregnant patients)(1 - 1-dose 75+ series) Never donecan get at pharmacy COVID-19 Vaccine(5 - 2024-25 season) due on 12/24/2024declined  No

## 2025-02-28 NOTE — PROGRESS NOTES
Jean Carlos Berger MD   [facility]  MEDICAL GROUP [facility] - FAMILY MEDICINE  [address] [place] [address] [place] [id number]  [phone]      PATIENT NAME: Margarita Turner  : 1950  DATE: 25  MRN: [id number]      Billing Provider: Jean Carlos Berger MD  Level of Service: NE OFFICE/OUTPT VISIT, EST, LEVL IV, 30-39 MIN  Patient PCP Information       Provider PCP Type    Jean Carlos Berger MD General            Reason for Visit / Chief Complaint: numbness shoulders, arms, hands and Health Maintenance (Hepatitis C Screening Never done declined/Diabetic Eye Exam Never done last done in  in [place]- pt can't remember name of clinic-will find out for us/TETANUS VACCINE Never donecan get at pharmacy/Shingles Vaccine(1 of 2) Never donecan get at pharmacy/Pneumococcal Vaccines (Age 50+)(2 of 2 - PCV) due on 2020/DEXA Scan due on 2021/RSV Vaccine (Age 60+ and Pregnant patients)(1 - 1-dose 75+ series) Never donecan get at pharmacy/COVID-19 Vaccine( season) due on )       Update PCP  Update Chief Complaint         History of Present Illness / Problem Focused Workflow     Margarita Turner presents to the clinic with numbness shoulders, arms, hands and Health Maintenance (Hepatitis C Screening Never done declined/Diabetic Eye Exam Never done last done in  in [place]- pt can't remember name of clinic-will find out for us/TETANUS VACCINE Never donecan get at pharmacy/Shingles Vaccine(1 of 2) Never donecan get at pharmacy/Pneumococcal Vaccines (Age 50+)(2 of 2 - PCV) due on 2020/DEXA Scan due on 2021/RSV Vaccine (Age 60+ and Pregnant patients)(1 - 1-dose 75+ series) Never donecan get at pharmacy/COVID-19 Vaccine( season) due on )       Has had numbness inBUE x 1 month that has progressively gotten worse.  It extends from shoulders to fingers (digits 2-5 bilaterally).  Says that pain and tingling wakes her up at night and is  "unable to get any good sleep.  She is on gabapentin 100 mg TID but has only been taking at night (300 mg) but this is not helping.  She does not want to increase her dosage.  Had resorted to sleeping in recliner to improve symptoms but recent got a sleep number bed that can be elevated.  It has not helped.  Symptoms are worse on the right side.  She recently had labs done and I will discuss these with her in detail.  Her TSH had risen and I have changed the dose of her synthroid.  Was also noted to have very high eosinophil count.  She reports "really bad allergies" and chronic sinusitis.  TG were >400 so LDL could not be calculated.  AST very mildly elevated.  A1C was 6.7.  Remainder of labs normal or without significant abnormalities.        Review of Systems     Review of Systems   Constitutional:  Negative for activity change, chills and fever.   HENT:  Negative for sore throat.    Eyes:  Negative for pain.   Respiratory:  Negative for cough, chest tightness and shortness of breath.    Cardiovascular:  Negative for chest pain and palpitations.   Gastrointestinal:  Negative for abdominal pain.   Musculoskeletal:  Positive for arthralgias and neck pain.   Neurological:  Positive for numbness. Negative for dizziness, syncope and weakness.   Psychiatric/Behavioral:  Negative for confusion.         Medical / Social / Family History     Past Medical History:   Diagnosis Date    Breast cancer     GERD (gastroesophageal reflux disease)     History of vertebral fracture     Hypothyroidism, unspecified     OA (osteoarthritis) of knee     Type 2 diabetes mellitus without complications        Past Surgical History:   Procedure Laterality Date    ABLATION, CHEMICAL SEALANT, VARICOSE VEIN Left 12/6/2024    Procedure: Left GSV w/ Left Pathological Calf  VenaSeal Ablation;  Surgeon: Felix Padron DO;  Location: [facility];  Service: Peripheral Vascular;  Laterality: Left;    ABLATION, CHEMICAL SEALANT, " VARICOSE VEIN Left 12/13/2024    Procedure: Left SSV VenaSeal Ablation;  Surgeon: Felix Padron DO;  Location: [facility] [place], [place];  Service: Peripheral Vascular;  Laterality: Left;    ABLATION, CHEMICAL SEALANT, VARICOSE VEIN Right 12/20/2024    Procedure: Right GSV w/ Right Pathological Calf  VenaSeal Ablation;  Surgeon: Felix Padron DO;  Location: [facility] [place], [place];  Service: Peripheral Vascular;  Laterality: Right;    APPENDECTOMY      CATARACT EXTRACTION, BILATERAL      KNEE ARTHROSCOPY Right     MASTECTOMY, PARTIAL, WITH AXILLARY LYMPHADENECTOMY      OOPHORECTOMY Right     PARTIAL HYSTERECTOMY      REMOVAL OF BONE SPUR OF FOOT Right     TOTAL KNEE ARTHROPLASTY Left 11/2023    TUBAL LIGATION         Social History    reports that she has never smoked. She has never been exposed to tobacco smoke. She has never used smokeless tobacco. She reports that she does not drink alcohol and does not use drugs.   Social History[1]    Family History  Family History   Problem Relation Name Age of Onset    Heart disease Mother      Arthritis Mother      Hypertension Mother      Diabetes Mother      Breast cancer Mother      Hyperlipidemia Mother      Alcohol abuse Father      Hyperlipidemia Father      Stroke Father      Hypertension Father      Hyperlipidemia Sister      Hypertension Sister      Hyperlipidemia Brother Navi Amberly     Hypertension Brother Navi Amberly     Diabetes Brother Navi Gaming     Colon cancer Brother Navi Amberly     Hyperlipidemia Brother      Hypertension Brother      Heart disease Brother      Diabetes Brother      Leukemia Brother         Medications and Allergies     Medications  Outpatient Medications Marked as Taking for the 2/28/25 encounter (Office Visit) with Jean Carlos Berger MD   Medication Sig Dispense Refill    acyclovir (ZOVIRAX) 800 MG Tab Take 1 tablet (800 mg total) by mouth 2 (two) times daily. As needed for shingles 60 tablet 11    anastrozole (ARIMIDEX) 1 mg Tab Take 1  mg by mouth once daily.      aspirin (ECOTRIN) 81 MG EC tablet Take 81 mg by mouth.      atorvastatin (LIPITOR) 20 MG tablet Take 1 tablet (20 mg total) by mouth every evening. 90 tablet 3    blood sugar diagnostic (ONETOUCH ULTRA TEST) Strp 1 strip by Misc.(Non-Drug; Combo Route) route 3 (three) times daily. 200 strip 2    blood-glucose meter (ONETOUCH ULTRA2 METER) kit Use as instructed 1 each 0    blood-glucose sensor (FREESTYLE TEJA 3 SENSOR) [facility] Use as directed to monitor blood sugar 4 each 5    cyclobenzaprine (FLEXERIL) 10 MG tablet Take 1 tablet (10 mg total) by mouth every evening. 90 tablet 0    erythromycin (ROMYCIN) ophthalmic ointment Apply thin ribbon to affected eye twice daily 3.5 g 0    flash glucose scanning reader (RADSONESTYLE TEJA 14 DAY READER) Misc Use as directed to monitor blood sugar 1 each 0    fluticasone propionate (FLONASE) 50 mcg/actuation nasal spray 1 spray (50 mcg total) by Each Nostril route once daily. 11.1 mL 9    gabapentin (NEURONTIN) 100 MG capsule Take 1 capsule (100 mg total) by mouth 3 (three) times daily. 270 capsule 1    insulin degludec (TRESIBA FLEXTOUCH U-200) 200 unit/mL (3 mL) insulin pen Inject 24 Units into the skin once daily. 2 Pen 7    insulin regular (NOVOLIN R REGULAR U100 INSULIN) 100 unit/mL Inj injection Inject 4 Units into the skin once daily. 3 mL 11    lancets (ONETOUCH DELICA LANCETS) 33 gauge Misc 1 lancet  by Misc.(Non-Drug; Combo Route) route 3 (three) times daily. 200 each 2    levothyroxine (SYNTHROID) 150 MCG tablet Take 1 tablet (150 mcg total) by mouth before breakfast. 90 tablet 1    loratadine (CLARITIN) 10 mg tablet Take 1 tablet (10 mg total) by mouth once daily. 90 tablet 2    metFORMIN (GLUCOPHAGE-XR) 500 MG ER 24hr tablet Take 1 tablet (500 mg total) by mouth 3 (three) times daily. 90 tablet 5    omeprazole (PRILOSEC) 40 MG capsule Take 1 capsule (40 mg total) by mouth once daily. 90 capsule 1    [DISCONTINUED] HYDROcodone-acetaminophen  (NORCO)  mg per tablet Take 1 tablet by mouth every 8 (eight) hours as needed. PRN       Current Facility-Administered Medications for the 2/28/25 encounter (Office Visit) with Jean Carlos Berger MD   Medication Dose Route Frequency Provider Last Rate Last Admin    [COMPLETED] pneumoc 20-kimmie conj-dip cr(PF) (PREVNAR-20 (PF)) injection Syrg 0.5 mL  0.5 mL Intramuscular 1 time in Clinic/HOD Jean Carlos Berger MD   0.5 mL at 02/28/25 1053       Allergies  Review of patient's allergies indicates:  No Known Allergies    Physical Examination     Vitals:    02/28/25 0947   BP: 117/62   Pulse: 94     Physical Exam  Vitals reviewed.   Constitutional:       Appearance: Normal appearance.   HENT:      Head: Normocephalic and atraumatic.   Eyes:      Extraocular Movements: Extraocular movements intact.      Conjunctiva/sclera: Conjunctivae normal.      Pupils: Pupils are equal, round, and reactive to light.   Cardiovascular:      Rate and Rhythm: Normal rate and regular rhythm.      Heart sounds: Normal heart sounds.   Pulmonary:      Effort: Pulmonary effort is normal.      Breath sounds: Normal breath sounds.   Musculoskeletal:         General: Normal range of motion.      Cervical back: Normal range of motion.   Skin:     General: Skin is warm and dry.   Neurological:      General: No focal deficit present.      Mental Status: She is alert and oriented to person, place, and time.   Psychiatric:         Mood and Affect: Mood normal.         Behavior: Behavior normal.          Office Visit on 02/12/2025   Component Date Value Ref Range Status    Creatinine, Urine 02/12/2025 86  15 - 325 mg/dL Final    Microalbumin 02/12/2025 0.7  <=3.0 mg/dL Final    Microalbumin/Creatinine Ratio 02/12/2025 8.1  0.0 - 30.0 mg/g Final    Sodium 02/12/2025 140  136 - 145 mmol/L Final    Potassium 02/12/2025 5.0  3.5 - 5.1 mmol/L Final    Chloride 02/12/2025 105  98 - 107 mmol/L Final    CO2 02/12/2025 25  23 - 31 mmol/L Final    Anion Gap  02/12/2025 15  7 - 16 mmol/L Final    Glucose 02/12/2025 105  82 - 115 mg/dL Final    BUN 02/12/2025 13  10 - 20 mg/dL Final    Creatinine 02/12/2025 0.73  0.55 - 1.02 mg/dL Final    BUN/Creatinine Ratio 02/12/2025 18  6 - 20 Final    Calcium 02/12/2025 10.0  8.4 - 10.2 mg/dL Final    Total Protein 02/12/2025 6.8  5.8 - 7.6 g/dL Final    Albumin 02/12/2025 4.0  3.4 - 4.8 g/dL Final    Globulin 02/12/2025 2.8  2.0 - 4.0 g/dL Final    A/G Ratio 02/12/2025 1.4   Final    Bilirubin, Total 02/12/2025 0.5  <=1.5 mg/dL Final    Alk Phos 02/12/2025 109  40 - 150 U/L Final    ALT 02/12/2025 32  <=55 U/L Final    AST 02/12/2025 42 (H)  5 - 34 U/L Final    eGFR 02/12/2025 85  >=60 mL/min/1.73m2 Final    Estimated GFR calculated using the CKD-EPI creatinine (2021) equation.    Hemoglobin A1C 02/12/2025 6.7  <=7.0 % Final      Normal:               <5.7%  Pre-Diabetic:       5.7% to 6.4%  Diabetic:             >6.4%  Diabetic Goal:     <7%    Estimated Average Glucose 02/12/2025 146  mg/dL Final    Triglycerides 02/12/2025 405 (H)  37 - 140 mg/dL Final      Normal:  <150 mg/dL  Borderline High: 150-199 mg/dL  High:   200-499 mg/dL  Very High:  >=500    Cholesterol 02/12/2025 149  <=200 mg/dL Final      <200 mg/dL:  Desirable  200-240 mg/dL: Borderline High  >240 mg/dL:  High    HDL Cholesterol 02/12/2025 39  35 - 60 mg/dL Final      <40 mg/dL: Low HDL  40-60 mg/dL: Normal  >60 mg/dL: Desirable    Cholesterol/HDL Ratio (Risk Factor) 02/12/2025 3.8   Final    Non-HDL 02/12/2025 110  mg/dL Final    VLDL 02/12/2025    Final    Calculation invalid due to Triglyceride value > 400    TSH 02/12/2025 6.727 (H)  0.350 - 4.940 uIU/mL Final    WBC 02/12/2025 4.75  4.50 - 11.00 K/uL Final    RBC 02/12/2025 4.15 (L)  4.20 - 5.40 M/uL Final    Hemoglobin 02/12/2025 12.6  12.0 - 16.0 g/dL Final    Hematocrit 02/12/2025 39.0  38.0 - 47.0 % Final    MCV 02/12/2025 94.0  80.0 - 96.0 fL Final    MCH 02/12/2025 30.4  27.0 - 31.0 pg Final    MCHC  02/12/2025 32.3  32.0 - 36.0 g/dL Final    RDW 02/12/2025 12.0  11.5 - 14.5 % Final    Platelet Count 02/12/2025 352  150 - 400 K/uL Final    MPV 02/12/2025 9.7  9.4 - 12.4 fL Final    Neutrophils % 02/12/2025 29.2 (L)  53.0 - 65.0 % Final    Lymphocytes % 02/12/2025 33.5  27.0 - 41.0 % Final    Monocytes % 02/12/2025 10.5 (H)  2.0 - 6.0 % Final    Eosinophils % 02/12/2025 23.4 (H)  1.0 - 4.0 % Final    Basophils % 02/12/2025 2.3 (H)  0.0 - 1.0 % Final    Immature Granulocytes % 02/12/2025 1.1 (H)  0.0 - 0.4 % Final    nRBC, Auto 02/12/2025 0.0  <=0.0 % Final    Neutrophils, Abs 02/12/2025 1.39 (L)  1.80 - 7.70 K/uL Final    Lymphocytes, Absolute 02/12/2025 1.59  1.00 - 4.80 K/uL Final    Monocytes, Absolute 02/12/2025 0.50  0.00 - 0.80 K/uL Final    Eosinophils, Absolute 02/12/2025 1.11 (H)  0.00 - 0.50 K/uL Final    Basophils, Absolute 02/12/2025 0.11  0.00 - 0.20 K/uL Final    Immature Granulocytes, Absolute 02/12/2025 0.05 (H)  0.00 - 0.04 K/uL Final    nRBC, Absolute 02/12/2025 0.00  <=0.00 x10e3/uL Final    Diff Type 02/12/2025 Auto   Final         Assessment and Plan (including Health Maintenance)      Problem List  Smart Sets  Document Outside HM   :    Plan: she will RTC next week for fasting labs and cervical xray.  Needs MRI but will have xray first.          Health Maintenance Due   Topic Date Due    Diabetic Eye Exam  Never done    TETANUS VACCINE  Never done    Shingles Vaccine (1 of 2) Never done    DEXA Scan  09/17/2021    RSV Vaccine (Age 60+ and Pregnant patients) (1 - 1-dose 75+ series) Never done       Problem List Items Addressed This Visit          Oncology    History of breast cancer (Chronic)    Relevant Orders    CBC Auto Differential       Endocrine    Hypothyroidism - Primary (Chronic)- synthroid increased to 150 mcg daily     Other Visit Diagnoses         Eosinophilia, unspecified type        Relevant Orders    CBC Auto Differential      Mixed hyperlipidemia  (Chronic)       Relevant  Orders    LDL Cholesterol, Direct      Type 2 diabetes mellitus with diabetic polyneuropathy, with long-term current use of insulin  (Chronic)         Seasonal allergies  (Chronic)         Cervicalgia        Relevant Medications    HYDROcodone-acetaminophen (NORCO)  mg per tablet    Other Relevant Orders    X-Ray Cervical Spine AP And Lateral      Immunization due        Relevant Medications    pneumoc 20-kimmie conj-dip cr(PF) (PREVNAR-20 (PF)) injection Syrg 0.5 mL (Completed)            Health Maintenance Topics with due status: Not Due       Topic Last Completion Date    Diabetes Urine Screening 02/12/2025    Lipid Panel 02/12/2025    Hemoglobin A1c 02/12/2025       Future Appointments   Date Time Provider Department Center   4/14/2025 10:15 AM Felix Padron DO OB VEIN [facility]   8/8/2025  2:30 PM Jean Carlos Berger MD [facility]   8/29/2025 10:00 AM AWV NURSE, [facility] FAMILY MEDICINE [facility]            Signature:  Jean Carlos Berger MD  [facility]  MEDICAL GROUP OF [address] [address]  [phone]    Date of encounter: 2/28/25         [1]   Social History  Tobacco Use    Smoking status: Never     Passive exposure: Never    Smokeless tobacco: Never   Substance Use Topics    Alcohol use: Never    Drug use: Never

## 2025-02-28 NOTE — PATIENT INSTRUCTIONS
Please return to clinic next Tuesday or Thursday to have blood work done and to get an xray of your neck.  The orders have already  been placed.  Please follow up for a visit within a week of having those tests done and we will discuss further treatment as needed at that time.  Thanks.

## 2025-03-05 DIAGNOSIS — Z79.4 TYPE 2 DIABETES MELLITUS WITHOUT COMPLICATION, WITH LONG-TERM CURRENT USE OF INSULIN: Chronic | ICD-10-CM

## 2025-03-05 DIAGNOSIS — E78.2 MIXED HYPERLIPIDEMIA: Chronic | ICD-10-CM

## 2025-03-05 DIAGNOSIS — E11.9 TYPE 2 DIABETES MELLITUS WITHOUT COMPLICATION, WITH LONG-TERM CURRENT USE OF INSULIN: Chronic | ICD-10-CM

## 2025-03-05 DIAGNOSIS — E11.42 TYPE 2 DIABETES MELLITUS WITH DIABETIC POLYNEUROPATHY, WITH LONG-TERM CURRENT USE OF INSULIN: Chronic | ICD-10-CM

## 2025-03-05 DIAGNOSIS — Z79.4 TYPE 2 DIABETES MELLITUS WITH DIABETIC POLYNEUROPATHY, WITH LONG-TERM CURRENT USE OF INSULIN: Chronic | ICD-10-CM

## 2025-03-05 RX ORDER — ATORVASTATIN CALCIUM 20 MG/1
20 TABLET, FILM COATED ORAL NIGHTLY
Qty: 90 TABLET | Refills: 0 | OUTPATIENT
Start: 2025-03-05

## 2025-03-05 RX ORDER — ATORVASTATIN CALCIUM 20 MG/1
20 TABLET, FILM COATED ORAL NIGHTLY
Qty: 90 TABLET | Refills: 3 | Status: CANCELLED | OUTPATIENT
Start: 2025-03-05

## 2025-03-11 ENCOUNTER — OFFICE VISIT (OUTPATIENT)
Dept: FAMILY MEDICINE | Facility: CLINIC | Age: 75
End: 2025-03-11
Payer: MEDICARE

## 2025-03-11 VITALS
SYSTOLIC BLOOD PRESSURE: 122 MMHG | WEIGHT: 203.88 LBS | BODY MASS INDEX: 32 KG/M2 | DIASTOLIC BLOOD PRESSURE: 79 MMHG | HEART RATE: 99 BPM | HEIGHT: 67 IN | OXYGEN SATURATION: 95 %

## 2025-03-11 DIAGNOSIS — Z79.4 TYPE 2 DIABETES MELLITUS WITH DIABETIC POLYNEUROPATHY, WITH LONG-TERM CURRENT USE OF INSULIN: Chronic | ICD-10-CM

## 2025-03-11 DIAGNOSIS — E78.2 MIXED HYPERLIPIDEMIA: Chronic | ICD-10-CM

## 2025-03-11 DIAGNOSIS — F41.8 SITUATIONAL ANXIETY: ICD-10-CM

## 2025-03-11 DIAGNOSIS — E11.42 TYPE 2 DIABETES MELLITUS WITH DIABETIC POLYNEUROPATHY, WITH LONG-TERM CURRENT USE OF INSULIN: Chronic | ICD-10-CM

## 2025-03-11 DIAGNOSIS — M50.30 DDD (DEGENERATIVE DISC DISEASE), CERVICAL: Chronic | ICD-10-CM

## 2025-03-11 DIAGNOSIS — M54.2 CERVICALGIA: Primary | Chronic | ICD-10-CM

## 2025-03-11 PROCEDURE — 1101F PT FALLS ASSESS-DOCD LE1/YR: CPT | Mod: ,,, | Performed by: FAMILY MEDICINE

## 2025-03-11 PROCEDURE — 1159F MED LIST DOCD IN RCRD: CPT | Mod: ,,, | Performed by: FAMILY MEDICINE

## 2025-03-11 PROCEDURE — 1160F RVW MEDS BY RX/DR IN RCRD: CPT | Mod: ,,, | Performed by: FAMILY MEDICINE

## 2025-03-11 PROCEDURE — 1125F AMNT PAIN NOTED PAIN PRSNT: CPT | Mod: ,,, | Performed by: FAMILY MEDICINE

## 2025-03-11 PROCEDURE — 99214 OFFICE O/P EST MOD 30 MIN: CPT | Mod: ,,, | Performed by: FAMILY MEDICINE

## 2025-03-11 PROCEDURE — 3288F FALL RISK ASSESSMENT DOCD: CPT | Mod: ,,, | Performed by: FAMILY MEDICINE

## 2025-03-11 PROCEDURE — 3074F SYST BP LT 130 MM HG: CPT | Mod: ,,, | Performed by: FAMILY MEDICINE

## 2025-03-11 PROCEDURE — 3078F DIAST BP <80 MM HG: CPT | Mod: ,,, | Performed by: FAMILY MEDICINE

## 2025-03-11 RX ORDER — GABAPENTIN 300 MG/1
300 CAPSULE ORAL 2 TIMES DAILY
Qty: 60 CAPSULE | Refills: 11 | Status: SHIPPED | OUTPATIENT
Start: 2025-03-11 | End: 2026-03-11

## 2025-03-11 RX ORDER — ATORVASTATIN CALCIUM 20 MG/1
20 TABLET, FILM COATED ORAL NIGHTLY
Qty: 90 TABLET | Refills: 3 | Status: SHIPPED | OUTPATIENT
Start: 2025-03-11

## 2025-03-11 RX ORDER — ATORVASTATIN CALCIUM 20 MG/1
20 TABLET, FILM COATED ORAL NIGHTLY
Qty: 90 TABLET | Refills: 3 | Status: SHIPPED | OUTPATIENT
Start: 2025-03-11 | End: 2025-03-11 | Stop reason: SDUPTHER

## 2025-03-11 RX ORDER — ALPRAZOLAM 0.5 MG/1
0.5 TABLET ORAL ONCE
Qty: 1 TABLET | Refills: 0 | Status: SHIPPED | OUTPATIENT
Start: 2025-03-11 | End: 2025-03-11

## 2025-03-11 NOTE — PROGRESS NOTES
Jean Carlos Berger MD   [facility]  MEDICAL GROUP [facility]  [address] [place] [address] [place] [address]  [phone]      PATIENT NAME: Margarita Turner  : 1950  DATE: 3/11/25  MRN: [id number]      Billing Provider: Jean Carlos Berger MD  Level of Service: TX OFFICE/OUTPT VISIT, EST, LEVL IV, 30-39 MIN  Patient PCP Information       Provider PCP Type    Jean Carlos Berger MD General            Reason for Visit / Chief Complaint: Results, Hand Pain, and Health Maintenance (Postponed until next visit due to patient having acute issues today. /Hepatitis C Screening Never done/Diabetic Eye Exam Never done/TETANUS VACCINE Never done/Shingles Vaccine(1 of 2) Never done/DEXA Scan due on 2021/RSV Vaccine (Age 60+ and Pregnant patients)(1 - 1-dose 75+ series) Never done/)       Update PCP  Update Chief Complaint         History of Present Illness / Problem Focused Workflow     Margarita Turner presents to the clinic with Results, Hand Pain, and Health Maintenance (Postponed until next visit due to patient having acute issues today. /Hepatitis C Screening Never done/Diabetic Eye Exam Never done/TETANUS VACCINE Never done/Shingles Vaccine(1 of 2) Never done/DEXA Scan due on 2021/RSV Vaccine (Age 60+ and Pregnant patients)(1 - 1-dose 75+ series) Never done/)       Follow up neck pain and lab results.  She has significant eosinophilia.  LDL was good (112).  Cervical xray showed multilevel DDD worse at C5-C7.    Hand Pain   Associated symptoms include numbness. Pertinent negatives include no chest pain.       Review of Systems     Review of Systems   Constitutional:  Negative for activity change, chills and fever.   HENT:  Negative for sore throat.    Eyes:  Negative for pain.   Respiratory:  Negative for cough, chest tightness and shortness of breath.    Cardiovascular:  Negative for chest pain and palpitations.   Gastrointestinal:  Negative for abdominal pain.    Musculoskeletal:  Positive for arthralgias and neck pain.   Neurological:  Positive for numbness. Negative for dizziness, syncope and weakness.   Psychiatric/Behavioral:  Negative for confusion.         Medical / Social / Family History     Past Medical History:   Diagnosis Date    Breast cancer     GERD (gastroesophageal reflux disease)     History of vertebral fracture     Hypothyroidism, unspecified     OA (osteoarthritis) of knee     Type 2 diabetes mellitus without complications        Past Surgical History:   Procedure Laterality Date    ABLATION, CHEMICAL SEALANT, VARICOSE VEIN Left 12/6/2024    Procedure: Left GSV w/ Left Pathological Calf  VenaSeal Ablation;  Surgeon: Felix Padron DO;  Location: [facility];  Service: Peripheral Vascular;  Laterality: Left;    ABLATION, CHEMICAL SEALANT, VARICOSE VEIN Left 12/13/2024    Procedure: Left SSV VenaSeal Ablation;  Surgeon: Felix Padron DO;  Location: [facility];  Service: Peripheral Vascular;  Laterality: Left;    ABLATION, CHEMICAL SEALANT, VARICOSE VEIN Right 12/20/2024    Procedure: Right GSV w/ Right Pathological Calf  VenaSeal Ablation;  Surgeon: Felix Padron DO;  Location: [facility];  Service: Peripheral Vascular;  Laterality: Right;    APPENDECTOMY      CATARACT EXTRACTION, BILATERAL      KNEE ARTHROSCOPY Right     MASTECTOMY, PARTIAL, WITH AXILLARY LYMPHADENECTOMY      OOPHORECTOMY Right     PARTIAL HYSTERECTOMY      REMOVAL OF BONE SPUR OF FOOT Right     TOTAL KNEE ARTHROPLASTY Left 11/2023    TUBAL LIGATION         Social History    reports that she has never smoked. She has never been exposed to tobacco smoke. She has never used smokeless tobacco. She reports that she does not drink alcohol and does not use drugs.   Social History[1]    Family History  Family History   Problem Relation Name Age of Onset    Heart disease Mother      Arthritis Mother      Hypertension Mother      Diabetes  Mother      Breast cancer Mother      Hyperlipidemia Mother      Alcohol abuse Father      Hyperlipidemia Father      Stroke Father      Hypertension Father      Hyperlipidemia Sister      Hypertension Sister      Hyperlipidemia Brother Navi Gaming     Hypertension Brother Navi Gaming     Diabetes Brother Navi Gaming     Colon cancer Brother Navi Amberly     Hyperlipidemia Brother      Hypertension Brother      Heart disease Brother      Diabetes Brother      Leukemia Brother         Medications and Allergies     Medications  Outpatient Medications Marked as Taking for the 3/11/25 encounter (Office Visit) with Jean Carlos Berger MD   Medication Sig Dispense Refill    acyclovir (ZOVIRAX) 800 MG Tab Take 1 tablet (800 mg total) by mouth 2 (two) times daily. As needed for shingles 60 tablet 11    anastrozole (ARIMIDEX) 1 mg Tab Take 1 mg by mouth once daily.      aspirin (ECOTRIN) 81 MG EC tablet Take 81 mg by mouth.      blood sugar diagnostic (ONETOUCH ULTRA TEST) Strp 1 strip by Misc.(Non-Drug; Combo Route) route 3 (three) times daily. 200 strip 2    blood-glucose meter (ONETOUCH ULTRA2 METER) kit Use as instructed 1 each 0    blood-glucose sensor (FlixsterSTYLE TEJA 3 SENSOR) [facility] Use as directed to monitor blood sugar 4 each 5    cyclobenzaprine (FLEXERIL) 10 MG tablet Take 1 tablet (10 mg total) by mouth every evening. 90 tablet 0    erythromycin (ROMYCIN) ophthalmic ointment Apply thin ribbon to affected eye twice daily 3.5 g 0    flash glucose scanning reader (FlixsterSTYLE TEJA 14 DAY READER) Misc Use as directed to monitor blood sugar 1 each 0    fluticasone propionate (FLONASE) 50 mcg/actuation nasal spray 1 spray (50 mcg total) by Each Nostril route once daily. 11.1 mL 9    gabapentin (NEURONTIN) 100 MG capsule Take 1 capsule (100 mg total) by mouth 3 (three) times daily. 270 capsule 1    HYDROcodone-acetaminophen (NORCO)  mg per tablet Take 1 tablet by mouth every 8 (eight) hours as needed for Pain (neck). You may  halve the tablet if necessary 20 tablet 0    insulin degludec (TRESIBA FLEXTOUCH U-200) 200 unit/mL (3 mL) insulin pen Inject 24 Units into the skin once daily. 2 Pen 7    insulin regular (NOVOLIN R REGULAR U100 INSULIN) 100 unit/mL Inj injection Inject 4 Units into the skin once daily. 3 mL 11    lancets (ONETOUCH DELICA LANCETS) 33 gauge Misc 1 lancet  by Misc.(Non-Drug; Combo Route) route 3 (three) times daily. 200 each 2    levothyroxine (SYNTHROID) 150 MCG tablet Take 1 tablet (150 mcg total) by mouth before breakfast. 90 tablet 1    loratadine (CLARITIN) 10 mg tablet Take 1 tablet (10 mg total) by mouth once daily. 90 tablet 2    metFORMIN (GLUCOPHAGE-XR) 500 MG ER 24hr tablet Take 1 tablet (500 mg total) by mouth 3 (three) times daily. 90 tablet 5    omeprazole (PRILOSEC) 40 MG capsule Take 1 capsule (40 mg total) by mouth once daily. 90 capsule 1    [DISCONTINUED] atorvastatin (LIPITOR) 20 MG tablet Take 1 tablet (20 mg total) by mouth every evening. 90 tablet 3       Allergies  Review of patient's allergies indicates:  No Known Allergies    Physical Examination     Vitals:    03/11/25 1334   BP: 122/79   Pulse: 99     Physical Exam  Vitals reviewed.   Constitutional:       Appearance: Normal appearance.   HENT:      Head: Normocephalic and atraumatic.   Eyes:      Extraocular Movements: Extraocular movements intact.      Conjunctiva/sclera: Conjunctivae normal.      Pupils: Pupils are equal, round, and reactive to light.   Cardiovascular:      Rate and Rhythm: Normal rate and regular rhythm.      Heart sounds: Normal heart sounds.   Pulmonary:      Effort: Pulmonary effort is normal.      Breath sounds: Normal breath sounds.   Musculoskeletal:         General: Normal range of motion.      Cervical back: Normal range of motion.   Skin:     General: Skin is warm and dry.   Neurological:      General: No focal deficit present.      Mental Status: She is alert and oriented to person, place, and time.    Psychiatric:         Mood and Affect: Mood normal.         Behavior: Behavior normal.        X-Ray Cervical Spine AP And Lateral  Narrative: EXAMINATION:  XR CERVICAL SPINE AP LATERAL    CLINICAL HISTORY:  Cervicalgia    TECHNIQUE:  AP, lateral and open mouth views of the cervical spine were performed.    COMPARISON:  None.    FINDINGS:  Cervical vertebral bodies are normal in height and alignment.  No acute fracture or subluxation.  No significant prevertebral soft tissue swelling.    There is mild to moderate multilevel degenerative disc disease most pronounced from C5 through C7.  Impression: Mild to moderate multilevel degenerative disc disease.    Electronically signed by: Satish Almendarez  Date:    03/05/2025  Time:    07:07    Lab Visit on 03/04/2025   Component Date Value Ref Range Status    LDL Chol (Beta-Quantification), S 03/04/2025 112  mg/dL Final       -------------------REFERENCE VALUE--------------------------  Desirable: <100 mg/dL  Above Desirable: 100-129 mg/dL  Borderline High: 130-159 mg/dL  High: 160-189 mg/dL  Very High: >=190 mg/dL     -------------------ADDITIONAL INFORMATION-------------------  This test has been modified from the 's   instructions. Its performance characteristics were   determined by [facility] in a manner consistent with   CLIA requirements. This test has not been cleared or   approved by the U.S. Food and Drug Administration.     Test Performed by:  [address] [address]  : Williams Perez Ph.D.; CLIA# [id number]    WBC 03/04/2025 6.40  4.50 - 11.00 K/uL Final    RBC 03/04/2025 4.12 (L)  4.20 - 5.40 M/uL Final    Hemoglobin 03/04/2025 12.5  12.0 - 16.0 g/dL Final    Hematocrit 03/04/2025 39.1  38.0 - 47.0 % Final    MCV 03/04/2025 94.9  80.0 - 96.0 fL Final    MCH 03/04/2025 30.3  27.0 - 31.0 pg Final    MCHC 03/04/2025 32.0  32.0 - 36.0 g/dL Final    RDW 03/04/2025 12.3  11.5  - 14.5 % Final    Platelet Count 03/04/2025 316  150 - 400 K/uL Final    MPV 03/04/2025 9.4  9.4 - 12.4 fL Final    Neutrophils % 03/04/2025 43.5 (L)  53.0 - 65.0 % Final    Lymphocytes % 03/04/2025 22.3 (L)  27.0 - 41.0 % Final    Monocytes % 03/04/2025 9.1 (H)  2.0 - 6.0 % Final    Eosinophils % 03/04/2025 23.8 (H)  1.0 - 4.0 % Final    Basophils % 03/04/2025 1.1 (H)  0.0 - 1.0 % Final    Immature Granulocytes % 03/04/2025 0.2  0.0 - 0.4 % Final    nRBC, Auto 03/04/2025 0.0  <=0.0 % Final    Neutrophils, Abs 03/04/2025 2.79  1.80 - 7.70 K/uL Final    Lymphocytes, Absolute 03/04/2025 1.43  1.00 - 4.80 K/uL Final    Monocytes, Absolute 03/04/2025 0.58  0.00 - 0.80 K/uL Final    Eosinophils, Absolute 03/04/2025 1.52 (H)  0.00 - 0.50 K/uL Final    Basophils, Absolute 03/04/2025 0.07  0.00 - 0.20 K/uL Final    Immature Granulocytes, Absolute 03/04/2025 0.01  0.00 - 0.04 K/uL Final    nRBC, Absolute 03/04/2025 0.00  <=0.00 x10e3/uL Final    Diff Type 03/04/2025 Auto   Final       Assessment and Plan (including Health Maintenance)      Problem List  Smart Sets  Document Outside HM   :    Plan: needs MRI.  I have placed an order for MRI c-sppine and am increasing gabapentin dosage.  Anxiolytic prior to MRI.        Health Maintenance Due   Topic Date Due    Hepatitis C Screening  Never done    Diabetic Eye Exam  Never done    TETANUS VACCINE  Never done    Shingles Vaccine (1 of 2) Never done    DEXA Scan  09/17/2021    RSV Vaccine (Age 60+ and Pregnant patients) (1 - 1-dose 75+ series) Never done       Problem List Items Addressed This Visit    None  Visit Diagnoses         Cervicalgia  (Chronic)   -  Primary    Relevant Medications    gabapentin (NEURONTIN) 300 MG capsule    Other Relevant Orders    MRI Cervical Spine Without Contrast      Mixed hyperlipidemia  (Chronic)       Relevant Medications    atorvastatin (LIPITOR) 20 MG tablet      Situational anxiety        Relevant Medications    ALPRAZolam (XANAX) 0.5 MG  tablet      DDD (degenerative disc disease), cervical  (Chronic)         Type 2 diabetes mellitus with diabetic polyneuropathy, with long-term current use of insulin  (Chronic)       Relevant Medications    atorvastatin (LIPITOR) 20 MG tablet            Health Maintenance Topics with due status: Not Due       Topic Last Completion Date    Diabetes Urine Screening 02/12/2025    Hemoglobin A1c 02/12/2025    Lipid Panel 03/04/2025       Future Appointments   Date Time Provider Department Center   3/11/2025  3:00 PM Jean Carlos Berger MD [facility]   4/14/2025 10:15 AM Felix Padron DO [facility]   8/8/2025  2:30 PM Jean Carlos Berger MD [facility]   8/29/2025 10:00 AM AW[facility] NURSE, [facility]            Signature:  Jean Carlos Berger MD  [facility]  MEDICAL GROUP [facility] FAM[facility] MEDICINE  [address] [place] [id number]  [phone]    Date of encounter: 3/11/25         [1]   Social History  Tobacco Use    Smoking status: Never     Passive exposure: Never    Smokeless tobacco: Never   Substance Use Topics    Alcohol use: Never    Drug use: Never

## 2025-03-20 ENCOUNTER — HOSPITAL ENCOUNTER (OUTPATIENT)
Dept: RADIOLOGY | Facility: HOSPITAL | Age: 75
Discharge: HOME | End: 2025-03-20
Attending: FAMILY MEDICINE
Payer: MEDICARE

## 2025-03-20 DIAGNOSIS — M54.2 CERVICALGIA: Chronic | ICD-10-CM

## 2025-03-20 PROCEDURE — 72141 MRI NECK SPINE W/O DYE: CPT | Mod: TC

## 2025-03-25 ENCOUNTER — OFFICE VISIT (OUTPATIENT)
Dept: FAMILY MEDICINE | Facility: CLINIC | Age: 75
End: 2025-03-25
Payer: MEDICARE

## 2025-03-25 VITALS
HEART RATE: 106 BPM | BODY MASS INDEX: 32.72 KG/M2 | WEIGHT: 208.5 LBS | HEIGHT: 67 IN | DIASTOLIC BLOOD PRESSURE: 61 MMHG | SYSTOLIC BLOOD PRESSURE: 96 MMHG | OXYGEN SATURATION: 93 %

## 2025-03-25 DIAGNOSIS — Z79.4 TYPE 2 DIABETES MELLITUS WITH DIABETIC POLYNEUROPATHY, WITH LONG-TERM CURRENT USE OF INSULIN: Chronic | ICD-10-CM

## 2025-03-25 DIAGNOSIS — M54.2 CERVICALGIA: Primary | ICD-10-CM

## 2025-03-25 DIAGNOSIS — E11.42 TYPE 2 DIABETES MELLITUS WITH DIABETIC POLYNEUROPATHY, WITH LONG-TERM CURRENT USE OF INSULIN: Chronic | ICD-10-CM

## 2025-03-25 DIAGNOSIS — M50.30 DDD (DEGENERATIVE DISC DISEASE), CERVICAL: Chronic | ICD-10-CM

## 2025-03-25 PROCEDURE — 99214 OFFICE O/P EST MOD 30 MIN: CPT | Mod: 25,,, | Performed by: FAMILY MEDICINE

## 2025-03-25 PROCEDURE — 1125F AMNT PAIN NOTED PAIN PRSNT: CPT | Mod: ,,, | Performed by: FAMILY MEDICINE

## 2025-03-25 PROCEDURE — 96372 THER/PROPH/DIAG INJ SC/IM: CPT | Mod: ,,, | Performed by: FAMILY MEDICINE

## 2025-03-25 PROCEDURE — 3078F DIAST BP <80 MM HG: CPT | Mod: ,,, | Performed by: FAMILY MEDICINE

## 2025-03-25 PROCEDURE — 1101F PT FALLS ASSESS-DOCD LE1/YR: CPT | Mod: ,,, | Performed by: FAMILY MEDICINE

## 2025-03-25 PROCEDURE — 3288F FALL RISK ASSESSMENT DOCD: CPT | Mod: ,,, | Performed by: FAMILY MEDICINE

## 2025-03-25 PROCEDURE — 3074F SYST BP LT 130 MM HG: CPT | Mod: ,,, | Performed by: FAMILY MEDICINE

## 2025-03-25 PROCEDURE — 1159F MED LIST DOCD IN RCRD: CPT | Mod: ,,, | Performed by: FAMILY MEDICINE

## 2025-03-25 PROCEDURE — 1160F RVW MEDS BY RX/DR IN RCRD: CPT | Mod: ,,, | Performed by: FAMILY MEDICINE

## 2025-03-25 RX ORDER — METHYLPREDNISOLONE ACETATE 80 MG/ML
80 INJECTION, SUSPENSION INTRA-ARTICULAR; INTRALESIONAL; INTRAMUSCULAR; SOFT TISSUE
Status: COMPLETED | OUTPATIENT
Start: 2025-03-25 | End: 2025-03-25

## 2025-03-25 RX ORDER — HYDROCODONE BITARTRATE AND ACETAMINOPHEN 10; 325 MG/1; MG/1
1 TABLET ORAL EVERY 6 HOURS PRN
Qty: 45 TABLET | Refills: 0 | Status: SHIPPED | OUTPATIENT
Start: 2025-03-25

## 2025-03-25 RX ADMIN — METHYLPREDNISOLONE ACETATE 80 MG: 80 INJECTION, SUSPENSION INTRA-ARTICULAR; INTRALESIONAL; INTRAMUSCULAR; SOFT TISSUE at 02:03

## 2025-03-25 NOTE — PROGRESS NOTES
Jean Carlos Berger MD   [facility]  [facility] [address] [address]  [phone]      PATIENT NAME: Margarita Turner  : 1950  DATE: 3/25/25  MRN: [id number]      Billing Provider: Jean Carlos Berger MD  Level of Service: MS OFFICE/OUTPT VISIT, EST, LEVL IV, 30-39 MIN  Patient PCP Information       Provider PCP Type    Jean Carlos Berger MD General            Reason for Visit / Chief Complaint: Neck Pain and mri results       Update PCP  Update Chief Complaint         History of Present Illness / Problem Focused Workflow     Margarita Turner presents to the clinic with Neck Pain and mri results       Neck pain has not improved any.  Had MRI done whish showed some severe foraminal stenosis.  Wants referral to see a neurosurgeon.  Her pain is making it very difficult to sleep and also wakes her up from sleep.  Has numbness and tingling in both hands.  Is diabetic but glucose is well controlled.        Review of Systems     Review of Systems   Constitutional:  Negative for activity change, chills and fever.   HENT:  Negative for sore throat.    Eyes:  Negative for pain.   Respiratory:  Negative for cough, chest tightness and shortness of breath.    Cardiovascular:  Negative for chest pain and palpitations.   Gastrointestinal:  Negative for abdominal pain.   Musculoskeletal:  Positive for arthralgias and neck pain.   Neurological:  Positive for numbness. Negative for dizziness, syncope and weakness.   Psychiatric/Behavioral:  Positive for sleep disturbance. Negative for confusion.         Medical / Social / Family History     Past Medical History:   Diagnosis Date    Breast cancer     GERD (gastroesophageal reflux disease)     History of vertebral fracture     Hypothyroidism, unspecified     OA (osteoarthritis) of knee     Type 2 diabetes mellitus without complications        Past Surgical History:   Procedure Laterality Date    ABLATION,  CHEMICAL SEALANT, VARICOSE VEIN Left 12/6/2024    Procedure: Left GSV w/ Left Pathological Calf  VenaSeal Ablation;  Surgeon: Felix Padron DO;  Location: [facility];  Service: Peripheral Vascular;  Laterality: Left;    ABLATION, CHEMICAL SEALANT, VARICOSE VEIN Left 12/13/2024    Procedure: Left SSV VenaSeal Ablation;  Surgeon: Felix Padron DO;  Location: [facility];  Service: Peripheral Vascular;  Laterality: Left;    ABLATION, CHEMICAL SEALANT, VARICOSE VEIN Right 12/20/2024    Procedure: Right GSV w/ Right Pathological Calf  VenaSeal Ablation;  Surgeon: Felix Padron DO;  Location: [facility];  Service: Peripheral Vascular;  Laterality: Right;    APPENDECTOMY      CATARACT EXTRACTION, BILATERAL      KNEE ARTHROSCOPY Right     MASTECTOMY, PARTIAL, WITH AXILLARY LYMPHADENECTOMY      OOPHORECTOMY Right     PARTIAL HYSTERECTOMY      REMOVAL OF BONE SPUR OF FOOT Right     TOTAL KNEE ARTHROPLASTY Left 11/2023    TUBAL LIGATION         Social History    reports that she has never smoked. She has never been exposed to tobacco smoke. She has never used smokeless tobacco. She reports that she does not drink alcohol and does not use drugs.   Social History[1]    Family History  Family History   Problem Relation Name Age of Onset    Heart disease Mother      Arthritis Mother      Hypertension Mother      Diabetes Mother      Breast cancer Mother      Hyperlipidemia Mother      Alcohol abuse Father      Hyperlipidemia Father      Stroke Father      Hypertension Father      Hyperlipidemia Sister      Hypertension Sister      Hyperlipidemia Brother Navi Gaming     Hypertension Brother Navi Gaming     Diabetes Brother Navi Gaming     Colon cancer Brother Navi Gaming     Hyperlipidemia Brother      Hypertension Brother      Heart disease Brother      Diabetes Brother      Leukemia Brother         Medications and Allergies     Medications  Outpatient Medications Marked as Taking  for the 3/25/25 encounter (Office Visit) with Jean Carlos Berger MD   Medication Sig Dispense Refill    acyclovir (ZOVIRAX) 800 MG Tab Take 1 tablet (800 mg total) by mouth 2 (two) times daily. As needed for shingles 60 tablet 11    anastrozole (ARIMIDEX) 1 mg Tab Take 1 mg by mouth once daily.      aspirin (ECOTRIN) 81 MG EC tablet Take 81 mg by mouth.      atorvastatin (LIPITOR) 20 MG tablet Take 1 tablet (20 mg total) by mouth every evening. 90 tablet 3    blood sugar diagnostic (ONETOUCH ULTRA TEST) Strp 1 strip by Misc.(Non-Drug; Combo Route) route 3 (three) times daily. 200 strip 2    blood-glucose meter (ONETOUCH ULTRA2 METER) kit Use as instructed 1 each 0    blood-glucose sensor (FREESTYLE TEJA 3 SENSOR) Myriam Use as directed to monitor blood sugar 4 each 5    cyclobenzaprine (FLEXERIL) 10 MG tablet Take 1 tablet (10 mg total) by mouth every evening. 90 tablet 0    erythromycin (ROMYCIN) ophthalmic ointment Apply thin ribbon to affected eye twice daily 3.5 g 0    flash glucose scanning reader (FREESTYLE TEJA 14 DAY READER) Misc Use as directed to monitor blood sugar 1 each 0    fluticasone propionate (FLONASE) 50 mcg/actuation nasal spray 1 spray (50 mcg total) by Each Nostril route once daily. 11.1 mL 9    gabapentin (NEURONTIN) 100 MG capsule Take 1 capsule (100 mg total) by mouth 3 (three) times daily. 270 capsule 1    gabapentin (NEURONTIN) 300 MG capsule Take 1 capsule (300 mg total) by mouth 2 (two) times daily. 60 capsule 11    HYDROcodone-acetaminophen (NORCO)  mg per tablet Take 1 tablet by mouth every 8 (eight) hours as needed for Pain (neck). You may halve the tablet if necessary 20 tablet 0    insulin degludec (TRESIBA FLEXTOUCH U-200) 200 unit/mL (3 mL) insulin pen Inject 24 Units into the skin once daily. 2 Pen 7    insulin regular (NOVOLIN R REGULAR U100 INSULIN) 100 unit/mL Inj injection Inject 4 Units into the skin once daily. 3 mL 11    lancets (ONETOUCH DELICA LANCETS) 33 gauge  Misc 1 lancet  by Misc.(Non-Drug; Combo Route) route 3 (three) times daily. 200 each 2    levothyroxine (SYNTHROID) 150 MCG tablet Take 1 tablet (150 mcg total) by mouth before breakfast. 90 tablet 1    loratadine (CLARITIN) 10 mg tablet Take 1 tablet (10 mg total) by mouth once daily. 90 tablet 2    metFORMIN (GLUCOPHAGE-XR) 500 MG ER 24hr tablet Take 1 tablet (500 mg total) by mouth 3 (three) times daily. 90 tablet 5    omeprazole (PRILOSEC) 40 MG capsule Take 1 capsule (40 mg total) by mouth once daily. 90 capsule 1     Current Facility-Administered Medications for the 3/25/25 encounter (Office Visit) with Jean Carlos Berger MD   Medication Dose Route Frequency Provider Last Rate Last Admin    [COMPLETED] methylPREDNISolone acetate injection 80 mg  80 mg Intramuscular 1 time in Clinic/HOD Jean Carlos Berger MD   80 mg at 03/25/25 1406       Allergies  Review of patient's allergies indicates:  No Known Allergies    Physical Examination     Vitals:    03/25/25 1328   BP: 96/61   Pulse: 106     Physical Exam  Vitals reviewed.   Constitutional:       Appearance: Normal appearance.   HENT:      Head: Normocephalic and atraumatic.   Eyes:      Extraocular Movements: Extraocular movements intact.      Conjunctiva/sclera: Conjunctivae normal.      Pupils: Pupils are equal, round, and reactive to light.   Neck:      Comments: ROM abnormal  Cardiovascular:      Rate and Rhythm: Normal rate and regular rhythm.      Heart sounds: Normal heart sounds.   Pulmonary:      Effort: Pulmonary effort is normal.      Breath sounds: Normal breath sounds.   Musculoskeletal:      Cervical back: Tenderness present.   Skin:     General: Skin is warm and dry.   Neurological:      General: No focal deficit present.      Mental Status: She is alert and oriented to person, place, and time.   Psychiatric:         Mood and Affect: Mood normal.         Behavior: Behavior normal.      MRI Cervical Spine Without Contrast  Narrative:  EXAMINATION:  MRI CERVICAL SPINE WITHOUT CONTRAST    CLINICAL HISTORY:  Neck pain, chronic, degenerative changes on xray; Cervicalgia    TECHNIQUE:  Multiplanar, multisequence MR images of the cervical spine were performed without the use of intravenous contrast.    COMPARISON:  None.    FINDINGS:  Alignment: Normal.    Vertebrae: Normal marrow signal. No fracture.    Discs: Moderate disc space narrowing at C5-6 with minimal disc space narrowing and desiccation elsewhere.    Cord: Normal.    Cervicomedullary junction appears adequately maintained.    No paraspinal mass or fluid collection.    Degenerative findings:    C2-C3: No significant abnormality.    C3-C4: Disc protrusion at the right neural foramen with probable severe right foraminal narrowing.    C4-C5: Minimal posterior disc osteophyte complex more prominent on the right.  Severe right foraminal narrowing.  Mild left foraminal narrowing.  Central canal is adequately maintained.    C5-C6: Mild diffuse posterior disc osteophyte complex.  Mild flattening the anterior thecal sac with no significant central canal narrowing.  Moderate bilateral foraminal narrowing left greater than right.    C6-C7: Mild posterior disc osteophyte complex more prominent near the neural foramen bilaterally left greater than right probable mild disc protrusion at the neural foramen bilaterally left greater than right.  Severe left foraminal narrowing and moderate right foraminal narrowing.    C7-T1: No significant abnormality.    T1-T2: No significant abnormality.  Impression: 1. No acute abnormality.  2. Multilevel chronic degenerative changes.  See above comments.    Electronically signed by: Dany Mcdaniel  Date:    03/23/2025  Time:    00:32        Assessment and Plan (including Health Maintenance)      Problem List  Smart Sets  Document Outside HM   :    Plan: Glucose is well controlled- will try steroid shot.  Continue all other current care        Health Maintenance Due    Topic Date Due    Hepatitis C Screening  Never done    Diabetic Eye Exam  Never done    TETANUS VACCINE  Never done    Shingles Vaccine (1 of 2) Never done    DEXA Scan  09/17/2021    RSV Vaccine (Age 60+ and Pregnant patients) (1 - 1-dose 75+ series) Never done       Problem List Items Addressed This Visit    None  Visit Diagnoses         Cervicalgia    -  Primary    Relevant Medications    HYDROcodone-acetaminophen (NORCO)  mg per tablet    methylPREDNISolone acetate injection 80 mg (Completed)    Other Relevant Orders    Ambulatory referral/consult to Neurosurgery      Type 2 diabetes mellitus with diabetic polyneuropathy, with long-term current use of insulin  (Chronic)         DDD (degenerative disc disease), cervical  (Chronic)       Relevant Medications    HYDROcodone-acetaminophen (NORCO)  mg per tablet            Health Maintenance Topics with due status: Not Due       Topic Last Completion Date    Diabetes Urine Screening 02/12/2025    Hemoglobin A1c 02/12/2025    Lipid Panel 03/04/2025       Future Appointments   Date Time Provider Department Center   4/14/2025 10:15 AM Fleix Padron DO [facility]IN [facility]   8/8/2025  2:30 PM Jean Carlos Berger MD [facility]   8/29/2025 10:00 AM AWV NURSE, [facility]            Signature:  Jean Carlos Berger MD  [facility]  [facility] [place] [facility]  [address] [place] [id number]  [phone]    Date of encounter: 3/25/25         [1]   Social History  Tobacco Use    Smoking status: Never     Passive exposure: Never    Smokeless tobacco: Never   Substance Use Topics    Alcohol use: Never    Drug use: Never

## 2025-03-25 NOTE — PATIENT INSTRUCTIONS
EXAMINATION:  MRI CERVICAL SPINE WITHOUT CONTRAST     CLINICAL HISTORY:  Neck pain, chronic, degenerative changes on xray; Cervicalgia     TECHNIQUE:  Multiplanar, multisequence MR images of the cervical spine were performed without the use of intravenous contrast.     COMPARISON:  None.     FINDINGS:  Alignment: Normal.     Vertebrae: Normal marrow signal. No fracture.     Discs: Moderate disc space narrowing at C5-6 with minimal disc space narrowing and desiccation elsewhere.     Cord: Normal.     Cervicomedullary junction appears adequately maintained.     No paraspinal mass or fluid collection.     Degenerative findings:     C2-C3: No significant abnormality.     C3-C4: Disc protrusion at the right neural foramen with probable severe right foraminal narrowing.     C4-C5: Minimal posterior disc osteophyte complex more prominent on the right.  Severe right foraminal narrowing.  Mild left foraminal narrowing.  Central canal is adequately maintained.     C5-C6: Mild diffuse posterior disc osteophyte complex.  Mild flattening the anterior thecal sac with no significant central canal narrowing.  Moderate bilateral foraminal narrowing left greater than right.     C6-C7: Mild posterior disc osteophyte complex more prominent near the neural foramen bilaterally left greater than right probable mild disc protrusion at the neural foramen bilaterally left greater than right.  Severe left foraminal narrowing and moderate right foraminal narrowing.     C7-T1: No significant abnormality.     T1-T2: No significant abnormality.     Impression:     1. No acute abnormality.  2. Multilevel chronic degenerative changes.  See above comments.        Electronically signed by:Dany Mcdaniel  Date:                                            03/23/2025  Time:                                           00:32        Exam Ended: 03/20/25 09:19 CDT Last Resulted: 03/23/25 00:32 CDT         X-Ray Cervical Spine AP And Lateral  Order: [id number]    Status: Final result       Next appt: 04/14/2025 at 10:15 AM in Vascular Surgery (Felix Padron, )       Dx: Cervicalgia    Test Result Released: Yes (not seen)    0 Result Notes  Details    Reading Physician Reading Date Result Priority   Satish lAmendarez MD  [phone]  3/5/2025 Routine     Narrative & Impression  EXAMINATION:  XR CERVICAL SPINE AP LATERAL     CLINICAL HISTORY:  Cervicalgia     TECHNIQUE:  AP, lateral and open mouth views of the cervical spine were performed.     COMPARISON:  None.     FINDINGS:  Cervical vertebral bodies are normal in height and alignment.  No acute fracture or subluxation.  No significant prevertebral soft tissue swelling.     There is mild to moderate multilevel degenerative disc disease most pronounced from C5 through C7.     Impression:     Mild to moderate multilevel degenerative disc disease.        Electronically signed by:Satish Almendarez  Date:                                            03/05/2025  Time:                                           07:07        Exam Ended: 03/04/25 09:30 CST Last Resulted: 03/05/25 07:07 CST

## 2025-04-09 DIAGNOSIS — M54.2 CERVICALGIA: Primary | ICD-10-CM

## 2025-04-14 ENCOUNTER — OFFICE VISIT (OUTPATIENT)
Dept: VASCULAR SURGERY | Facility: CLINIC | Age: 75
End: 2025-04-14
Payer: MEDICARE

## 2025-04-14 VITALS
HEART RATE: 91 BPM | RESPIRATION RATE: 18 BRPM | BODY MASS INDEX: 31.2 KG/M2 | DIASTOLIC BLOOD PRESSURE: 74 MMHG | WEIGHT: 198.81 LBS | SYSTOLIC BLOOD PRESSURE: 128 MMHG | HEIGHT: 67 IN

## 2025-04-14 DIAGNOSIS — I83.813 VARICOSE VEINS OF BILATERAL LOWER EXTREMITIES WITH PAIN: ICD-10-CM

## 2025-04-14 DIAGNOSIS — M79.605 LEG PAIN, BILATERAL: ICD-10-CM

## 2025-04-14 DIAGNOSIS — M79.604 LEG PAIN, BILATERAL: ICD-10-CM

## 2025-04-14 DIAGNOSIS — R60.0 EDEMA, LOWER EXTREMITY: ICD-10-CM

## 2025-04-14 DIAGNOSIS — I87.2 VENOUS INSUFFICIENCY: Primary | ICD-10-CM

## 2025-04-14 PROCEDURE — 3074F SYST BP LT 130 MM HG: CPT | Mod: CPTII,,, | Performed by: FAMILY MEDICINE

## 2025-04-14 PROCEDURE — 99215 OFFICE O/P EST HI 40 MIN: CPT | Mod: PBBFAC | Performed by: FAMILY MEDICINE

## 2025-04-14 PROCEDURE — 1159F MED LIST DOCD IN RCRD: CPT | Mod: CPTII,,, | Performed by: FAMILY MEDICINE

## 2025-04-14 PROCEDURE — 99214 OFFICE O/P EST MOD 30 MIN: CPT | Mod: S$PBB,,, | Performed by: FAMILY MEDICINE

## 2025-04-14 PROCEDURE — 3078F DIAST BP <80 MM HG: CPT | Mod: CPTII,,, | Performed by: FAMILY MEDICINE

## 2025-04-14 PROCEDURE — 1160F RVW MEDS BY RX/DR IN RCRD: CPT | Mod: CPTII,,, | Performed by: FAMILY MEDICINE

## 2025-04-14 PROCEDURE — 99999 PR PBB SHADOW E&M-EST. PATIENT-LVL V: CPT | Mod: PBBFAC,,, | Performed by: FAMILY MEDICINE

## 2025-04-14 RX ORDER — MELOXICAM 15 MG/1
15 TABLET ORAL
COMMUNITY
Start: 2025-04-04

## 2025-04-14 NOTE — PROGRESS NOTES
VEIN CENTER CLINIC NOTE    Patient ID: Margarita Turner is a 74 y.o. female.    I. HISTORY     Chief Complaint:   Chief Complaint   Patient presents with    Follow-up     3M PA         HPI: Margarita Turner is a 74 y.o. female who presents today for a 3-month follow-up after undergoing bilateral great saphenous vein non thermal ablation with pathological perforators as well as left small saphenous vein non thermal ablation.  Patient states she has done well since her procedures and noted less leg pain and edema.  She has been wearing her compression daily and tries to walk an adequate amount.  Overall, feels she is doing well.    Clinical summary:  Bilateral complete venous reflux study performed 10/22/2024 shows no evidence of DVT bilaterally.  The study also shows dilation and axial reflux of the bilateral great, left small saphenous vein and bilateral calf pathological perforators.    The patient was initially seen on 09/24/2024 by referral from Dr. Jean Carlos Berger for evaluation of lower extremity edema, varicose veins and pain.  Symptoms are worsening/persistent and began greater than 2 years ago.  Location is bilateral lower extremities below the knees. Symptoms are worse at the end of the day.  History of venous interventions includes none.  Denies family history of venous disease.  Denies history of DVT or cellulitis.  She states she has been wearing compression stockings for about 30-40 years.  She states that she continues to have persistent symptoms despite these measures and considers them life altering and would like to have the underlying condition corrected if possible.    The patient had a reflux study performed on 06/25/2024 at [facility] which noted reflux in the bilateral great saphenous veins as well as bilateral pathological perforators.  No DVTs bilaterally.  No deep venous reflux.  No reflux and small saphenous veins.    Venous Disease Medical Necessity Documentation  Initial Visit Date:  09/24/2024 Return Check Date:    Have you ever had a rupture or bleed from a varicose vein in your leg(s)?              [] Yes  [x] No   [] Yes   [] No   Have you ever been diagnosed with phlebitis, cellulitis, or inflammation in the area of the varicose veins of  your leg(s)?  [] Yes  [x] No    [] Yes   [] No   Do you have darkened or inflamed skin on your legs?   [x] Yes   [] No   [] Yes   [] No   Do you have leg swelling?     [x] Yes   [] No   [] Yes   [] No   Do you have leg pain?   [x] Yes   [] No   [] Yes   [] No   If yes, describe the type of pain?    [x]   Stabbing [x]  Radiating [x]  Aching   [x]  Tightness [x]  Throbbing               [x]  Burning [x]  Cramping              Do you have leg discomfort?   [x] Yes   [] No   [] Yes   [] No   If yes, describe the type of discomfort?    [x]  Heaviness [x]  Fullness   [x]  Restlessness [x] Tired/Fatigued [x] Itching              Have you ever worn compression hose?   [x] Yes   [] No   [] Yes   [] No   If yes, how long? ON AND OFF 15+ YEARS       Do you elevate your legs while sitting?   [x] Yes   [] No   [] Yes   [] No   Does venous disease (varicose veins, ulcers, skin changes, leg pain/swelling) interfere with your daily life?  If yes, check activities you are limited or unable to do.    [x]  Shower  [x]   Walk  []  Exercise  [] Play with children/grandchildren  []  Shop [] Work [x] Stand for any period of time [x] Sleep                               [x] Sitting for an extended period of time.           [x] Yes   [] No   [] Yes   [] No   Do you exercise/have you tried to exercise (i.e.  Walk our participate in a regular exercise routine)?  [x] Yes  [] No   [] Yes   [] No   BMI   31.22           Past Medical History:   Diagnosis Date    Breast cancer     GERD (gastroesophageal reflux disease)     History of vertebral fracture     Hypothyroidism, unspecified     OA (osteoarthritis) of knee     Type 2 diabetes mellitus without complications          Past Surgical History:   Procedure Laterality Date    ABLATION, CHEMICAL SEALANT, VARICOSE VEIN Left 12/6/2024    Procedure: Left GSV w/ Left Pathological Calf  VenaSeal Ablation;  Surgeon: Felix Padron DO;  Location: [facility];  Service: Peripheral Vascular;  Laterality: Left;    ABLATION, CHEMICAL SEALANT, VARICOSE VEIN Left 12/13/2024    Procedure: Left SSV VenaSeal Ablation;  Surgeon: Felix Padron DO;  Location: [facility];  Service: Peripheral Vascular;  Laterality: Left;    ABLATION, CHEMICAL SEALANT, VARICOSE VEIN Right 12/20/2024    Procedure: Right GSV w/ Right Pathological Calf  VenaSeal Ablation;  Surgeon: Felix Padron DO;  Location: [facility];  Service: Peripheral Vascular;  Laterality: Right;    APPENDECTOMY      CATARACT EXTRACTION, BILATERAL      KNEE ARTHROSCOPY Right     MASTECTOMY, PARTIAL, WITH AXILLARY LYMPHADENECTOMY      OOPHORECTOMY Right     PARTIAL HYSTERECTOMY      REMOVAL OF BONE SPUR OF FOOT Right     TOTAL KNEE ARTHROPLASTY Left 11/2023    TUBAL LIGATION         Social History     Tobacco Use   Smoking Status Never    Passive exposure: Never   Smokeless Tobacco Never         Current Outpatient Medications:     acyclovir (ZOVIRAX) 800 MG Tab, Take 1 tablet (800 mg total) by mouth 2 (two) times daily. As needed for shingles, Disp: 60 tablet, Rfl: 11    anastrozole (ARIMIDEX) 1 mg Tab, Take 1 mg by mouth once daily., Disp: , Rfl:     aspirin (ECOTRIN) 81 MG EC tablet, Take 81 mg by mouth., Disp: , Rfl:     atorvastatin (LIPITOR) 20 MG tablet, Take 1 tablet (20 mg total) by mouth every evening., Disp: 90 tablet, Rfl: 3    blood sugar diagnostic (ONETOUCH ULTRA TEST) Strp, 1 strip by Misc.(Non-Drug; Combo Route) route 3 (three) times daily., Disp: 200 strip, Rfl: 2    blood-glucose meter (ONETOUCH ULTRA2 METER) kit, Use as instructed, Disp: 1 each, Rfl: 0    blood-glucose sensor (FREESTYLE TEJA 3 SENSOR) Myriam, Use as  directed to monitor blood sugar, Disp: 4 each, Rfl: 5    cyclobenzaprine (FLEXERIL) 10 MG tablet, Take 1 tablet (10 mg total) by mouth every evening., Disp: 90 tablet, Rfl: 0    erythromycin (ROMYCIN) ophthalmic ointment, Apply thin ribbon to affected eye twice daily, Disp: 3.5 g, Rfl: 0    flash glucose scanning reader (CitiVoxSTYLE TEJA 14 DAY READER) Misc, Use as directed to monitor blood sugar, Disp: 1 each, Rfl: 0    fluticasone propionate (FLONASE) 50 mcg/actuation nasal spray, 1 spray (50 mcg total) by Each Nostril route once daily., Disp: 11.1 mL, Rfl: 9    gabapentin (NEURONTIN) 100 MG capsule, Take 1 capsule (100 mg total) by mouth 3 (three) times daily., Disp: 270 capsule, Rfl: 1    gabapentin (NEURONTIN) 300 MG capsule, Take 1 capsule (300 mg total) by mouth 2 (two) times daily., Disp: 60 capsule, Rfl: 11    HYDROcodone-acetaminophen (NORCO)  mg per tablet, Take 1 tablet by mouth every 8 (eight) hours as needed for Pain (neck). You may halve the tablet if necessary, Disp: 20 tablet, Rfl: 0    HYDROcodone-acetaminophen (NORCO)  mg per tablet, Take 1 tablet by mouth every 6 (six) hours as needed for Pain (cervicalgia)., Disp: 45 tablet, Rfl: 0    insulin degludec (TRESIBA FLEXTOUCH U-200) 200 unit/mL (3 mL) insulin pen, Inject 24 Units into the skin once daily., Disp: 2 Pen, Rfl: 7    insulin regular (NOVOLIN R REGULAR U100 INSULIN) 100 unit/mL Inj injection, Inject 4 Units into the skin once daily., Disp: 3 mL, Rfl: 11    lancets (ONETOUCH DELICA LANCETS) 33 gauge Misc, 1 lancet  by Misc.(Non-Drug; Combo Route) route 3 (three) times daily., Disp: 200 each, Rfl: 2    levothyroxine (SYNTHROID) 150 MCG tablet, Take 1 tablet (150 mcg total) by mouth before breakfast., Disp: 90 tablet, Rfl: 1    loratadine (CLARITIN) 10 mg tablet, Take 1 tablet (10 mg total) by mouth once daily., Disp: 90 tablet, Rfl: 2    meloxicam (MOBIC) 15 MG tablet, Take 15 mg by mouth., Disp: , Rfl:     metFORMIN  (GLUCOPHAGE-XR) 500 MG ER 24hr tablet, Take 1 tablet (500 mg total) by mouth 3 (three) times daily., Disp: 90 tablet, Rfl: 5    omeprazole (PRILOSEC) 40 MG capsule, Take 1 capsule (40 mg total) by mouth once daily., Disp: 90 capsule, Rfl: 1    ALPRAZolam (XANAX) 0.5 MG tablet, Take 1 tablet (0.5 mg total) by mouth once. Take 1 hour prior to procedure for 1 dose, Disp: 1 tablet, Rfl: 0    Review of Systems   Constitutional:  Negative for activity change, chills, diaphoresis, fatigue and fever.   Respiratory:  Negative for cough and shortness of breath.    Cardiovascular:  Positive for leg swelling. Negative for chest pain and claudication.        Hyperpigmentation LE   Gastrointestinal:  Negative for nausea and vomiting.   Musculoskeletal:  Positive for leg pain. Negative for joint swelling.   Integumentary:  Negative for rash and wound.   Neurological:  Negative for weakness and numbness.        II. PHYSICAL EXAM     Physical Exam  Constitutional:       General: She is awake. She is not in acute distress.     Appearance: Normal appearance. She is obese. She is not ill-appearing or toxic-appearing.   HENT:      Head: Normocephalic and atraumatic.   Eyes:      Extraocular Movements: Extraocular movements intact.      Conjunctiva/sclera: Conjunctivae normal.      Pupils: Pupils are equal, round, and reactive to light.   Neck:      Vascular: No carotid bruit or JVD.   Cardiovascular:      Rate and Rhythm: Normal rate and regular rhythm.      Pulses:           Dorsalis pedis pulses are detected w/ Doppler on the right side and detected w/ Doppler on the left side.        Posterior tibial pulses are detected w/ Doppler on the right side and detected w/ Doppler on the left side.      Heart sounds: No murmur heard.  Pulmonary:      Effort: Pulmonary effort is normal. No respiratory distress.      Breath sounds: No stridor. No wheezing, rhonchi or rales.   Musculoskeletal:         General: No swelling, tenderness or  deformity.      Right lower le+ Edema present.      Left lower le+ Edema present.      Comments: No evidence of cellulitis, weeping or open ulceration bilaterally.   Feet:      Comments: Triphasic hand-held dopplerable pulses of the bilateral dorsalis pedis and posterior tibial arteries.  Skin:     General: Skin is warm.      Capillary Refill: Capillary refill takes less than 2 seconds.      Coloration: Skin is not ashen.      Findings: No bruising, erythema, lesion, rash or wound.   Neurological:      Mental Status: She is alert and oriented to person, place, and time.      Motor: No weakness.   Psychiatric:         Speech: Speech normal.         Behavior: Behavior normal. Behavior is cooperative.         Reticular/Spider veins noted:  RLE: anterior calf, medial calf, posterior calf, and lateral calf  LLE: anterior calf, medial calf, posterior calf, and lateral calf    Varicose veins noted:  RLE: anterior calf, medial calf, posterior calf, and lateral calf  LLE:  anterior calf, medial calf, posterior calf, and lateral calf    CEAP Classification  Clinical Signs: Class 3 - Edema  Etiologic Classification: Primary  Anatomic distribution: Superficial  Pathophysiologic dysfunction: Reflux       Venous Clinical Severity Score  Pain:2=Daily, moderate activity limitation, occasional analgesics  Varicose Veins: 2=Multiple. GS varicose veins confined to calf or thigh  Venous Edema: 1=Evening ankle edema only  Pigmentation: 0=None or focal, low intensity (tan)  Inflammation: 0=None  Induration: 0=None  Number of Active Ulcers: 0=0  Active Ulceration, Duration: 0=None  Active Ulcer Size: 0=None  Compressive Therapy: 3=Full compliance, stockings + elevation  Total Score: 8       III. ASSESSMENT & PLAN (MEDICAL DECISION MAKING)     1. Venous insufficiency    2. Edema, lower extremity    3. Varicose veins of bilateral lower extremities with pain    4. Leg pain, bilateral        Assessment/Diagnosis and Plan:  The patient  is doing well postprocedure and is encouraged to at least wear knee-high compression along with regular leg exercise and leg elevation.  Follow-up in in 3 months for a 6-month post ablation.  No test unless she is having trouble.        Felix Padron, DO

## 2025-06-06 ENCOUNTER — OFFICE VISIT (OUTPATIENT)
Dept: NEUROLOGY | Facility: CLINIC | Age: 75
End: 2025-06-06
Payer: MEDICARE

## 2025-06-06 VITALS
DIASTOLIC BLOOD PRESSURE: 78 MMHG | WEIGHT: 208 LBS | OXYGEN SATURATION: 96 % | SYSTOLIC BLOOD PRESSURE: 140 MMHG | HEIGHT: 67 IN | HEART RATE: 106 BPM | BODY MASS INDEX: 32.65 KG/M2

## 2025-06-06 DIAGNOSIS — M51.362 DEGENERATION OF INTERVERTEBRAL DISC OF LUMBAR REGION WITH DISCOGENIC BACK PAIN AND LOWER EXTREMITY PAIN: Chronic | ICD-10-CM

## 2025-06-06 DIAGNOSIS — G56.03 BILATERAL CARPAL TUNNEL SYNDROME: Primary | ICD-10-CM

## 2025-06-06 DIAGNOSIS — M54.2 CERVICALGIA: ICD-10-CM

## 2025-06-06 PROCEDURE — 3077F SYST BP >= 140 MM HG: CPT | Mod: CPTII,,, | Performed by: PSYCHIATRY & NEUROLOGY

## 2025-06-06 PROCEDURE — 99999 PR PBB SHADOW E&M-EST. PATIENT-LVL V: CPT | Mod: PBBFAC,,, | Performed by: PSYCHIATRY & NEUROLOGY

## 2025-06-06 PROCEDURE — 99215 OFFICE O/P EST HI 40 MIN: CPT | Mod: PBBFAC | Performed by: PSYCHIATRY & NEUROLOGY

## 2025-06-06 PROCEDURE — 99204 OFFICE O/P NEW MOD 45 MIN: CPT | Mod: S$PBB,,, | Performed by: PSYCHIATRY & NEUROLOGY

## 2025-06-06 PROCEDURE — 3288F FALL RISK ASSESSMENT DOCD: CPT | Mod: CPTII,,, | Performed by: PSYCHIATRY & NEUROLOGY

## 2025-06-06 PROCEDURE — 3078F DIAST BP <80 MM HG: CPT | Mod: CPTII,,, | Performed by: PSYCHIATRY & NEUROLOGY

## 2025-06-06 PROCEDURE — 1101F PT FALLS ASSESS-DOCD LE1/YR: CPT | Mod: CPTII,,, | Performed by: PSYCHIATRY & NEUROLOGY

## 2025-06-06 PROCEDURE — 1159F MED LIST DOCD IN RCRD: CPT | Mod: CPTII,,, | Performed by: PSYCHIATRY & NEUROLOGY

## 2025-06-06 PROCEDURE — 1125F AMNT PAIN NOTED PAIN PRSNT: CPT | Mod: CPTII,,, | Performed by: PSYCHIATRY & NEUROLOGY

## 2025-06-06 RX ORDER — GABAPENTIN 300 MG/1
300 CAPSULE ORAL 3 TIMES DAILY
Qty: 270 CAPSULE | Refills: 3 | Status: SHIPPED | OUTPATIENT
Start: 2025-06-06

## 2025-06-24 ENCOUNTER — TELEPHONE (OUTPATIENT)
Dept: NEUROLOGY | Facility: CLINIC | Age: 75
End: 2025-06-24
Payer: MEDICARE

## 2025-06-24 ENCOUNTER — PATIENT MESSAGE (OUTPATIENT)
Dept: NEUROLOGY | Facility: CLINIC | Age: 75
End: 2025-06-24
Payer: MEDICARE

## 2025-06-24 NOTE — TELEPHONE ENCOUNTER
Copied from CRM #[id number]. Topic: General Inquiry - Patient Advice  >> Jun 24, 2025 10:37 AM Elana wrote:  Who Called: Margarita Yue    Caller is requesting assistance/information from provider's office.    Pt is needing to speak to nurse, says she has not got a call to schedule her nerve test.    Preferred Method of Contact: Phone Call  Patient's Preferred Phone Number on File: [phone]   Best Call Back Number, if different:  Additional Information:

## 2025-07-17 ENCOUNTER — TELEPHONE (OUTPATIENT)
Dept: ORTHOPEDICS | Facility: CLINIC | Age: 75
End: 2025-07-17
Payer: MEDICARE

## 2025-07-17 NOTE — TELEPHONE ENCOUNTER
Called patient and explained to her that our providers are in surgery tomorrow and offered her an appt with Dr. Berger on Monday 07/21/25 @ 10:50a.m. She stated that she had called [facility] and they are going to see her tomorrow morning.

## 2025-07-17 NOTE — TELEPHONE ENCOUNTER
Copied from CRM #[id number]. Topic: Appointments - Appointment Scheduling  >> Jul 17, 2025  9:54 AM Shari wrote:  Who Called: Margarita Turner    Caller is requesting a same day appointment. Caller declined first available appointment listed below.      When is the first available appointment?  07/28  Symptoms or reason for appointment:  Carpal Tunnel Symptoms, has had an EMG study at [facility] in [place], [place] ([place]). Asking to be seen 07/18, states the neurologist in [place] said this needs to be done as soon as possible due to dropping things and severity of EMG.    Preferred Method of Contact: Phone Call  Patient's Preferred Phone Number on File: [phone]

## 2025-07-18 ENCOUNTER — OFFICE VISIT (OUTPATIENT)
Dept: NEUROLOGY | Facility: CLINIC | Age: 75
End: 2025-07-18
Payer: MEDICARE

## 2025-07-18 VITALS
HEIGHT: 67 IN | OXYGEN SATURATION: 97 % | WEIGHT: 197 LBS | DIASTOLIC BLOOD PRESSURE: 76 MMHG | RESPIRATION RATE: 18 BRPM | SYSTOLIC BLOOD PRESSURE: 132 MMHG | HEART RATE: 100 BPM | BODY MASS INDEX: 30.92 KG/M2

## 2025-07-18 DIAGNOSIS — G56.03 BILATERAL CARPAL TUNNEL SYNDROME: Primary | ICD-10-CM

## 2025-07-18 PROCEDURE — 1101F PT FALLS ASSESS-DOCD LE1/YR: CPT | Mod: CPTII,,, | Performed by: PSYCHIATRY & NEUROLOGY

## 2025-07-18 PROCEDURE — 99215 OFFICE O/P EST HI 40 MIN: CPT | Mod: PBBFAC | Performed by: PSYCHIATRY & NEUROLOGY

## 2025-07-18 PROCEDURE — 1159F MED LIST DOCD IN RCRD: CPT | Mod: CPTII,,, | Performed by: PSYCHIATRY & NEUROLOGY

## 2025-07-18 PROCEDURE — 3078F DIAST BP <80 MM HG: CPT | Mod: CPTII,,, | Performed by: PSYCHIATRY & NEUROLOGY

## 2025-07-18 PROCEDURE — 99214 OFFICE O/P EST MOD 30 MIN: CPT | Mod: S$PBB,,, | Performed by: PSYCHIATRY & NEUROLOGY

## 2025-07-18 PROCEDURE — 99999 PR PBB SHADOW E&M-EST. PATIENT-LVL V: CPT | Mod: PBBFAC,,, | Performed by: PSYCHIATRY & NEUROLOGY

## 2025-07-18 PROCEDURE — 3288F FALL RISK ASSESSMENT DOCD: CPT | Mod: CPTII,,, | Performed by: PSYCHIATRY & NEUROLOGY

## 2025-07-18 PROCEDURE — 1125F AMNT PAIN NOTED PAIN PRSNT: CPT | Mod: CPTII,,, | Performed by: PSYCHIATRY & NEUROLOGY

## 2025-07-18 PROCEDURE — 3075F SYST BP GE 130 - 139MM HG: CPT | Mod: CPTII,,, | Performed by: PSYCHIATRY & NEUROLOGY

## 2025-07-18 NOTE — PROGRESS NOTES
Subjective:       Patient ID: Margarita Turner is a 75 y.o. female     Chief Complaint:    Chief Complaint   Patient presents with    bilateral carpal tunnel syndrome     Pt. States still having carpal tunnel.        Allergies:  Patient has no known allergies.    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  Patient has seen Orthopedic surgery in [place] who did not desire to do carpal tunnel release surgery but may recommend [facility] consider evaluation for such with patient's approval.  She is only taking gabapentin in the evening as she was concerned of its sedating effects.          Past Medical History:   Diagnosis Date    Breast cancer     GERD (gastroesophageal reflux disease)     History of vertebral fracture     Hypothyroidism, unspecified     OA (osteoarthritis) of knee     Type 2 diabetes mellitus without complications        Past Surgical History:   Procedure Laterality Date    ABLATION, CHEMICAL SEALANT, VARICOSE VEIN Left 12/6/2024    Procedure: Left GSV w/ Left Pathological Calf  VenaSeal Ablation;  Surgeon: Felix Padron DO;  Location: [facility];  Service: Peripheral Vascular;  Laterality: Left;    ABLATION, CHEMICAL SEALANT, VARICOSE VEIN Left 12/13/2024    Procedure: Left SSV VenaSeal Ablation;  Surgeon: Felix Padron DO;  Location: [facility];  Service: Peripheral Vascular;  Laterality: Left;    ABLATION, CHEMICAL SEALANT, VARICOSE VEIN Right 12/20/2024    Procedure: Right GSV w/ Right Pathological Calf  VenaSeal Ablation;  Surgeon: Vito  "Felix CALIXTO DO;  Location: [facility];  Service: Peripheral Vascular;  Laterality: Right;    APPENDECTOMY      CATARACT EXTRACTION, BILATERAL      KNEE ARTHROSCOPY Right     MASTECTOMY, PARTIAL, WITH AXILLARY LYMPHADENECTOMY      OOPHORECTOMY Right     PARTIAL HYSTERECTOMY      REMOVAL OF BONE SPUR OF FOOT Right     TOTAL KNEE ARTHROPLASTY Left 11/2023    TUBAL LIGATION         Social History  Ms. Turner  reports that she has never smoked. She has never been exposed to tobacco smoke. She has never used smokeless tobacco. She reports that she does not drink alcohol and does not use drugs.    Family History  Ms.'s Turner family history includes Alcohol abuse in her father; Arthritis in her mother; Breast cancer in her mother; Colon cancer in her brother; Diabetes in her brother, brother, and mother; Heart disease in her brother and mother; Hyperlipidemia in her brother, brother, father, mother, and sister; Hypertension in her brother, brother, father, mother, and sister; Leukemia in her brother; Stroke in her father.    Review of Systems  Review of Systems   Musculoskeletal:  Positive for back pain and joint pain.   Neurological:  Positive for tingling and sensory change.   All other systems reviewed and are negative.     Objective:   /76 (BP Location: Right arm, Patient Position: Sitting)   Pulse 100   Resp 18   Ht 5' 7" (1.702 m)   Wt 89.4 kg (197 lb)   SpO2 97%   BMI 30.85 kg/m²    NEUROLOGICAL EXAMINATION:     MENTAL STATUS   Oriented to person, place, and time.   Level of consciousness: alert  Knowledge: good.     CRANIAL NERVES   Cranial nerves II through XII intact.     MOTOR EXAM     Strength   Strength 5/5 throughout.     SENSORY EXAM        Bilateral median neuropathy left-greater-than-right consistent with carpal tunnel syndrome     GAIT AND COORDINATION     Gait  Gait: normal       Physical Exam  Vitals reviewed.   Constitutional:       Appearance: She is normal weight.   Neurological: "      Mental Status: She is alert and oriented to person, place, and time. Mental status is at baseline.      Cranial Nerves: Cranial nerves 2-12 are intact.      Motor: Motor strength is normal.     Gait: Gait is intact.          Assessment:     Bilateral carpal tunnel syndrome  -     Ambulatory referral/consult to Orthopedics; Future; Expected date: 07/25/2025         Primary Diagnosis and ICD10  Bilateral carpal tunnel syndrome [G56.03]    Plan:     Patient Instructions   Continue gabapentin at frequency and dosage tolerated for relief of neuropathic pain   Wear bilateral wrist splints to prevent flexion and extension  Limit overuse and repetitive motion of the upper extremities and wrist  Consider referral to Orthopedic surgery for possible carpal tunnel release   Follow-up three-months    There are no discontinued medications.    Requested Prescriptions      No prescriptions requested or ordered in this encounter            [1]   Outpatient Encounter Medications as of 7/18/2025   Medication Sig Dispense Refill    acyclovir (ZOVIRAX) 800 MG Tab Take 1 tablet (800 mg total) by mouth 2 (two) times daily. As needed for shingles 60 tablet 11    anastrozole (ARIMIDEX) 1 mg Tab Take 1 mg by mouth once daily.      aspirin (ECOTRIN) 81 MG EC tablet Take 81 mg by mouth.      atorvastatin (LIPITOR) 20 MG tablet Take 1 tablet (20 mg total) by mouth every evening. 90 tablet 3    blood sugar diagnostic (ONETOUCH ULTRA TEST) Strp 1 strip by Misc.(Non-Drug; Combo Route) route 3 (three) times daily. 200 strip 2    blood-glucose meter (ONETOUCH ULTRA2 METER) kit Use as instructed 1 each 0    cyclobenzaprine (FLEXERIL) 10 MG tablet Take 1 tablet (10 mg total) by mouth every evening. 90 tablet 0    fluticasone propionate (FLONASE) 50 mcg/actuation nasal spray 1 spray (50 mcg total) by Each Nostril route once daily. 11.1 mL 9    gabapentin (NEURONTIN) 300 MG capsule Take 1 capsule (300 mg total) by mouth 3 (three) times daily. 270  capsule 3    insulin degludec (TRESIBA FLEXTOUCH U-200) 200 unit/mL (3 mL) insulin pen Inject 24 Units into the skin once daily. 2 Pen 7    insulin regular (NOVOLIN R REGULAR U100 INSULIN) 100 unit/mL Inj injection Inject 4 Units into the skin once daily. 3 mL 11    levothyroxine (SYNTHROID) 150 MCG tablet Take 1 tablet (150 mcg total) by mouth before breakfast. 90 tablet 1    loratadine (CLARITIN) 10 mg tablet Take 1 tablet (10 mg total) by mouth once daily. 90 tablet 2    metFORMIN (GLUCOPHAGE-XR) 500 MG ER 24hr tablet Take 1 tablet (500 mg total) by mouth 3 (three) times daily. 90 tablet 5    omeprazole (PRILOSEC) 40 MG capsule Take 1 capsule (40 mg total) by mouth once daily. 90 capsule 1    vit B comp-min-hops-berberine 100-100 mg Tab Take by mouth.      ALPRAZolam (XANAX) 0.5 MG tablet Take 1 tablet (0.5 mg total) by mouth once. Take 1 hour prior to procedure for 1 dose (Patient not taking: Reported on 7/18/2025) 1 tablet 0    blood-glucose sensor (FREESTYLE TEJA 3 SENSOR) Myriam Use as directed to monitor blood sugar (Patient not taking: Reported on 7/18/2025) 4 each 5    erythromycin (ROMYCIN) ophthalmic ointment Apply thin ribbon to affected eye twice daily (Patient not taking: Reported on 7/18/2025) 3.5 g 0    flash glucose scanning reader (FREESTYLE TEJA 14 DAY READER) Misc Use as directed to monitor blood sugar (Patient not taking: Reported on 7/18/2025) 1 each 0    HYDROcodone-acetaminophen (NORCO)  mg per tablet Take 1 tablet by mouth every 8 (eight) hours as needed for Pain (neck). You may halve the tablet if necessary (Patient not taking: Reported on 7/18/2025) 20 tablet 0    HYDROcodone-acetaminophen (NORCO)  mg per tablet Take 1 tablet by mouth every 6 (six) hours as needed for Pain (cervicalgia). (Patient not taking: Reported on 7/18/2025) 45 tablet 0    lancets (ONETOUCH DELICA LANCETS) 33 gauge Misc 1 lancet  by Misc.(Non-Drug; Combo Route) route 3 (three) times daily. (Patient not  taking: Reported on 7/18/2025) 200 each 2    meloxicam (MOBIC) 15 MG tablet Take 15 mg by mouth. (Patient not taking: Reported on 7/18/2025)       No facility-administered encounter medications on file as of 7/18/2025.

## 2025-07-18 NOTE — PATIENT INSTRUCTIONS
Continue gabapentin at frequency and dosage tolerated for relief of neuropathic pain   Wear bilateral wrist splints to prevent flexion and extension  Limit overuse and repetitive motion of the upper extremities and wrist  Consider referral to Orthopedic surgery for possible carpal tunnel release   Follow-up three-months

## 2025-09-03 ENCOUNTER — OFFICE VISIT (OUTPATIENT)
Dept: FAMILY MEDICINE | Facility: CLINIC | Age: 75
End: 2025-09-03
Payer: MEDICARE

## 2025-09-03 VITALS
DIASTOLIC BLOOD PRESSURE: 69 MMHG | WEIGHT: 199.19 LBS | BODY MASS INDEX: 31.26 KG/M2 | OXYGEN SATURATION: 96 % | HEART RATE: 87 BPM | HEIGHT: 67 IN | SYSTOLIC BLOOD PRESSURE: 120 MMHG

## 2025-09-03 DIAGNOSIS — E11.9 TYPE 2 DIABETES MELLITUS WITHOUT COMPLICATION, WITH LONG-TERM CURRENT USE OF INSULIN: Chronic | ICD-10-CM

## 2025-09-03 DIAGNOSIS — Z01.818 PREOP TESTING: ICD-10-CM

## 2025-09-03 DIAGNOSIS — J30.2 SEASONAL ALLERGIES: Chronic | ICD-10-CM

## 2025-09-03 DIAGNOSIS — Z79.4 TYPE 2 DIABETES MELLITUS WITHOUT COMPLICATION, WITH LONG-TERM CURRENT USE OF INSULIN: Chronic | ICD-10-CM

## 2025-09-03 DIAGNOSIS — J06.9 UPPER RESPIRATORY TRACT INFECTION, UNSPECIFIED TYPE: ICD-10-CM

## 2025-09-03 DIAGNOSIS — G56.03 BILATERAL CARPAL TUNNEL SYNDROME: Primary | Chronic | ICD-10-CM

## 2025-09-03 DIAGNOSIS — G62.9 NEUROPATHY: Chronic | ICD-10-CM

## 2025-09-03 DIAGNOSIS — K21.9 GASTROESOPHAGEAL REFLUX DISEASE WITHOUT ESOPHAGITIS: Chronic | ICD-10-CM

## 2025-09-03 DIAGNOSIS — E03.9 HYPOTHYROIDISM, UNSPECIFIED TYPE: Chronic | ICD-10-CM

## 2025-09-03 LAB
ALBUMIN SERPL BCP-MCNC: 4.1 G/DL (ref 3.4–4.8)
ALBUMIN/GLOB SERPL: 1.4 {RATIO}
ALP SERPL-CCNC: 95 U/L (ref 40–150)
ALT SERPL W P-5'-P-CCNC: 28 U/L
ANION GAP SERPL CALCULATED.3IONS-SCNC: 11 MMOL/L (ref 7–16)
AST SERPL W P-5'-P-CCNC: 30 U/L (ref 11–45)
BASOPHILS # BLD AUTO: 0.05 K/UL (ref 0–0.2)
BASOPHILS NFR BLD AUTO: 0.9 % (ref 0–1)
BILIRUB SERPL-MCNC: 0.9 MG/DL
BUN SERPL-MCNC: 10 MG/DL (ref 10–20)
BUN/CREAT SERPL: 15 (ref 6–20)
CALCIUM SERPL-MCNC: 9.5 MG/DL (ref 8.4–10.2)
CHLORIDE SERPL-SCNC: 105 MMOL/L (ref 98–107)
CO2 SERPL-SCNC: 29 MMOL/L (ref 23–31)
CREAT SERPL-MCNC: 0.66 MG/DL (ref 0.55–1.02)
DIFFERENTIAL METHOD BLD: ABNORMAL
EGFR (NO RACE VARIABLE) (RUSH/TITUS): 90 ML/MIN/1.73M2
EOSINOPHIL # BLD AUTO: 0.36 K/UL (ref 0–0.5)
EOSINOPHIL NFR BLD AUTO: 6.3 % (ref 1–4)
ERYTHROCYTE [DISTWIDTH] IN BLOOD BY AUTOMATED COUNT: 12.6 % (ref 11.5–14.5)
EST. AVERAGE GLUCOSE BLD GHB EST-MCNC: 151 MG/DL
GLOBULIN SER-MCNC: 2.9 G/DL (ref 2–4)
GLUCOSE SERPL-MCNC: 144 MG/DL (ref 82–115)
HBA1C MFR BLD HPLC: 6.9 %
HCT VFR BLD AUTO: 36.8 % (ref 38–47)
HGB BLD-MCNC: 12.3 G/DL (ref 12–16)
IMM GRANULOCYTES # BLD AUTO: 0.02 K/UL (ref 0–0.04)
IMM GRANULOCYTES NFR BLD: 0.3 % (ref 0–0.4)
LYMPHOCYTES # BLD AUTO: 1.9 K/UL (ref 1–4.8)
LYMPHOCYTES NFR BLD AUTO: 33 % (ref 27–41)
MCH RBC QN AUTO: 30.8 PG (ref 27–31)
MCHC RBC AUTO-ENTMCNC: 33.4 G/DL (ref 32–36)
MCV RBC AUTO: 92.2 FL (ref 80–96)
MONOCYTES # BLD AUTO: 0.54 K/UL (ref 0–0.8)
MONOCYTES NFR BLD AUTO: 9.4 % (ref 2–6)
MPC BLD CALC-MCNC: 9.1 FL (ref 9.4–12.4)
NEUTROPHILS # BLD AUTO: 2.89 K/UL (ref 1.8–7.7)
NEUTROPHILS NFR BLD AUTO: 50.1 % (ref 53–65)
NRBC # BLD AUTO: 0 X10E3/UL
NRBC, AUTO (.00): 0 %
PLATELET # BLD AUTO: 326 K/UL (ref 150–400)
POTASSIUM SERPL-SCNC: 4.4 MMOL/L (ref 3.5–5.1)
PROT SERPL-MCNC: 7 G/DL (ref 5.8–7.6)
RBC # BLD AUTO: 3.99 M/UL (ref 4.2–5.4)
SODIUM SERPL-SCNC: 141 MMOL/L (ref 136–145)
TSH SERPL DL<=0.005 MIU/L-ACNC: 14.72 UIU/ML (ref 0.35–4.94)
WBC # BLD AUTO: 5.76 K/UL (ref 4.5–11)

## 2025-09-03 PROCEDURE — 83036 HEMOGLOBIN GLYCOSYLATED A1C: CPT | Mod: ,,, | Performed by: CLINICAL MEDICAL LABORATORY

## 2025-09-03 PROCEDURE — 80050 GENERAL HEALTH PANEL: CPT | Mod: ,,, | Performed by: CLINICAL MEDICAL LABORATORY

## 2025-09-03 RX ORDER — LORATADINE 10 MG/1
10 TABLET ORAL DAILY
Qty: 90 TABLET | Refills: 2 | Status: SHIPPED | OUTPATIENT
Start: 2025-09-03

## 2025-09-03 RX ORDER — FLUTICASONE PROPIONATE 50 MCG
1 SPRAY, SUSPENSION (ML) NASAL DAILY
Qty: 11.1 ML | Refills: 9 | Status: SHIPPED | OUTPATIENT
Start: 2025-09-03 | End: 2026-09-03

## 2025-09-03 RX ORDER — AZITHROMYCIN 250 MG/1
TABLET, FILM COATED ORAL
Qty: 6 TABLET | Refills: 0 | Status: SHIPPED | OUTPATIENT
Start: 2025-09-03 | End: 2025-09-08

## 2025-09-03 RX ORDER — OMEPRAZOLE 40 MG/1
40 CAPSULE, DELAYED RELEASE ORAL DAILY
Qty: 90 CAPSULE | Refills: 1 | Status: SHIPPED | OUTPATIENT
Start: 2025-09-03

## 2025-09-03 RX ORDER — GABAPENTIN 100 MG/1
200 CAPSULE ORAL DAILY
Qty: 60 CAPSULE | Refills: 5 | Status: SHIPPED | OUTPATIENT
Start: 2025-09-03

## 2025-09-03 RX ORDER — INSULIN DEGLUDEC 200 U/ML
24 INJECTION, SOLUTION SUBCUTANEOUS DAILY
Qty: 2 PEN | Refills: 7 | Status: SHIPPED | OUTPATIENT
Start: 2025-09-03

## 2025-09-04 DIAGNOSIS — E03.9 HYPOTHYROIDISM, UNSPECIFIED TYPE: Primary | ICD-10-CM

## 2025-09-04 RX ORDER — LEVOTHYROXINE SODIUM 175 UG/1
175 TABLET ORAL
Qty: 30 TABLET | Refills: 11 | Status: SHIPPED | OUTPATIENT
Start: 2025-09-04 | End: 2026-09-04

## (undated) DEVICE — KIT VENASEAL CLOSURE SYSTEM

## (undated) DEVICE — BNDG COFLEX FOAM LF2 ST 4X5YD

## (undated) DEVICE — PACK SUPERFICIAL VEN PROCEDURE

## (undated) DEVICE — BANDAGE MATRIX HK LOOP 6IN 5YD

## (undated) DEVICE — APPLICATOR CHLORAPREP ORN 26ML

## (undated) DEVICE — BNDG COFLEX FOAM LF2 ST 6X5YD

## (undated) DEVICE — KIT IV START

## (undated) DEVICE — SET EXTENSION CLEARLINK 2INJ

## (undated) DEVICE — GOWN POLY REINF BRTH SLV XL

## (undated) DEVICE — SET MICRO INTRO SHEATH 7F 7CM

## (undated) DEVICE — BANDAGE GAUZE COT STRL 4.5X4.1

## (undated) DEVICE — SUT ETHILON 3-0 PS2 18 BLK

## (undated) DEVICE — CATH IV INTROCAN 22G X 1

## (undated) DEVICE — SET EXT STD BORE CATH 7.6IN

## (undated) DEVICE — GLOVE SENSICARE PI SURG 6.5

## (undated) DEVICE — GLOVE SENSICARE PI SURG 7.5

## (undated) DEVICE — SOL CONTINU-FLO SET 2 LAV

## (undated) DEVICE — BANDAGE MATRIX HK LOOP 4IN 5YD

## (undated) DEVICE — CATH IV INTROCAN 24G X 3/4